# Patient Record
Sex: FEMALE | Race: WHITE | Employment: UNEMPLOYED | URBAN - METROPOLITAN AREA
[De-identification: names, ages, dates, MRNs, and addresses within clinical notes are randomized per-mention and may not be internally consistent; named-entity substitution may affect disease eponyms.]

---

## 2017-01-10 ENCOUNTER — TELEPHONE (OUTPATIENT)
Dept: GASTROENTEROLOGY | Facility: CLINIC | Age: 52
End: 2017-01-10

## 2017-01-15 ASSESSMENT — ENCOUNTER SYMPTOMS
POLYPHAGIA: 0
WEIGHT GAIN: 0
RECTAL BLEEDING: 1
DISTURBANCES IN COORDINATION: 0
WEIGHT LOSS: 0
TREMORS: 0
JOINT SWELLING: 0
NUMBNESS: 1
NAUSEA: 0
DECREASED APPETITE: 0
ALTERED TEMPERATURE REGULATION: 1
BLOOD IN STOOL: 0
ABDOMINAL PAIN: 1
DIZZINESS: 1
MYALGIAS: 1
BLOATING: 0
HEADACHES: 0
MEMORY LOSS: 0
JAUNDICE: 0
ARTHRALGIAS: 0
HEARTBURN: 1
MUSCLE WEAKNESS: 0
SEIZURES: 0
SPEECH CHANGE: 0
LOSS OF CONSCIOUSNESS: 0
CHILLS: 1
HALLUCINATIONS: 0
TINGLING: 1
BOWEL INCONTINENCE: 0
PARALYSIS: 0
NECK PAIN: 0
INCREASED ENERGY: 1
VOMITING: 0
STIFFNESS: 0
CONSTIPATION: 0
NIGHT SWEATS: 1
FATIGUE: 1
POLYDIPSIA: 1
FEVER: 0
DIARRHEA: 1
WEAKNESS: 0
RECTAL PAIN: 1
BACK PAIN: 1

## 2017-01-17 ENCOUNTER — OFFICE VISIT (OUTPATIENT)
Dept: GASTROENTEROLOGY | Facility: CLINIC | Age: 52
End: 2017-01-17

## 2017-01-17 VITALS
SYSTOLIC BLOOD PRESSURE: 92 MMHG | OXYGEN SATURATION: 95 % | BODY MASS INDEX: 21.16 KG/M2 | HEART RATE: 73 BPM | DIASTOLIC BLOOD PRESSURE: 55 MMHG | HEIGHT: 62 IN | WEIGHT: 115 LBS

## 2017-01-17 DIAGNOSIS — K50.80 CROHN'S DISEASE OF BOTH SMALL AND LARGE INTESTINE WITHOUT COMPLICATION (H): Primary | ICD-10-CM

## 2017-01-17 NOTE — Clinical Note
1/17/2017       RE: Melodie Dhillon  [address] [facility] [address]     Dear Colleague,    Thank you for referring your patient, Melodie Dhillon, to the [facility] GASTROENTEROLOGY AND IBD at [facility]. Please see a copy of my visit note below.    GI CLINIC VISIT    CC/REFERRING MD: Isidoro Esqueda MD    REASON FOR follow up: Crohn's Disease    IBD history:  Age at diagnosis: 49  Extend of disease: Ileum  Disease phenotype: inflammatory  Tierney-anal disease: none  Current CD medications: Infliximab  Prior IBD surgeries: none  Prior IBD Medications: prednisone.    IBD monitoring:  -TPMT level: 34.2  -Thiopurine levels: none  -IFX level: > 34 and ab neg 6/9/2015 and 1/2016    Please see initial consult note in 4/2015 for full details. Multiple colonoscopies by Dr. Esqueda that showed ileal inflammation. One colonoscopy also with cecal ulceration. Biopsies show non-specific inflammation in the ileum and ischemic changes in the cecum. She had been tried on steroids with minimal change in symptoms. Started on infliximab 5m/kg in February 2015 but only received 2 infusions due to nausea/vomiting. Referred to [facility] IBD clinic. Dr. Gudino reviewed pathology and one of the TI biopsies showed active chronic enteritis with pyloric metaplasia supportive of Crohn's Disease. Infliximab levels were adequate and ab neg. Restarted on Remicade early June 2015 after 2 months break.     Repeat colonoscopy 1/2016 normal (quiescant crohn's on right sided biopsies). MRE with some changes in jejunum but single balloon unremarkable.    Today:     Overall things are stable to somewhat better.     1. Nausea - resolved after stopping marijuana    2. Irregular bowel habits persist. She takes fiber bars. Levsin helps with cramping. May have several loose stools one day and hard stools the next. No blood.    Her PCP had her try Viberz but this didn't help.    3. Chronic left flank pain -  unchanged    4. Weight - stable    5. Had PNA in November - delayed Remicade by one month. This has resolved.      ROS:    Additional ROS  No fevers since November  No blurry vision, double vision or change in vision  No sore throat  No lymphadenopathy  No headache, paraesthesias, or weakness in a limb  No shortness of breath or wheezing  No chest pain or pressure  No arthralgias or myalgias  No rashes or skin changes  No odynophagia or dysphagia  No BRBPR, hematochezia, melena  No dysuria, frequency or urgency  No hot/cold intolerance or polyria  No anxiety or depression    Extra intestinal manifestations of IBD:  No uveitis/episcleritis  No aphthous ulcers    No arthritis    No erythema nodosum/pyoderma gangrenosum.     PERTINENT PAST MEDICAL HISTORY:  1. Crohn's as above  2. Cholelithiasis s/p cholecystecomy  3. Short segment Saleh's esophagus    PREVIOUS SURGERIES:  1. S/p claudine    ALLERGIES:  Allergies       Allergen     Reactions             Luminal [Phenobarbital]     Shortness Of Breath and Anaphylaxis             Penicillins     Unknown             Codeine     Swelling and Rash           PERTINENT MEDICATIONS:      Current Outpatient Prescriptions   Medication     Escitalopram Oxalate (LEXAPRO PO)     hyoscyamine (LEVSIN) 0.125 MG tablet     Calcium Carbonate-Vit D-Min (CALCIUM 1200 PO)     Cholecalciferol (VITAMIN D3 PO)     Esomeprazole Magnesium (NEXIUM PO)     Acetaminophen (TYLENOL PO)     DIAZEPAM PO     ondansetron (ZOFRAN-ODT) 4 MG disintegrating tablet     No current facility-administered medications for this visit.                                           SOCIAL HISTORY:     Social History       History       Social History             Marital Status:                  Spouse Name:     N/A             Number of Children:     N/A             Years of Education:     N/A       Occupational History             Not on file.       Social History Main Topics             Smoking status:      "Current Every Day Smoker -- 0.20 packs/day             Smokeless tobacco:     Never Used             Alcohol Use:     No             Drug Use:     No             Sexual Activity:     Not on file       Other Topics     Concern             Not on file       Social History Narrative                FAMILY HISTORY:  Reports many family have had \"stomach issues' - most notably vomiting. No known CD/UC or IBD. No colon cancer or colon polyps      Past/family/social history reviewed and no changes    PHYSICAL EXAMINATION:  Vitals reviewed, AFVSS   BP 92/55 mmHg  Pulse 73  Ht 1.575 m (5' 2\")  Wt 52.164 kg (115 lb)  BMI 21.03 kg/m2  SpO2 95%  Gen: aaox3, cooperative, pleasant, not dyspneic/diaphoretic, nad  HEENT: ncat, neck supple, no clad, normal op w/o ulcer/exudate, anicteric, mmm  Lymph: No axillary, submandibular, supraclavicular or inguinal lymphadenopathy  Resp/CV unremarkable  Abd: Nondistended, +bs, no hepatosplenomegaly, mild tenderness in epigastric region, no peritoneal signs  Ext: no c/c/e  Skin: warm, perfused, no jaundice        PERTINENT STUDIES:  Office Visit on 05/18/2015       Component     Date     Value     Ref Range     Status             Neutrophil Cytoplasmic IgG Antibody     05/18/2015           Final       Value:<1:20  Reference range: <1:20  (Note)  The ANCA IFA is <1:20; therefore, no further testing will  be performed.  INTERPRETIVE INFORMATION: Anti-Neutrophil Cyto Ab, IgG  Neutrophil Cytoplasmic Antibodies (C-ANCA = granular  cytoplasmic staining, P-ANCA = perinuclear staining) are  found in the serum of over 90 percent of patients with  certain necrotizing systemic vasculitides, and usually in  less than 5 percent of patients with collagen vascular  disease or arthritis.  Performed by [email or web address],  [address] [place] [id number] [phone]  [email or web address], Lucho Melendez MD, Lab. Director            FALGUNI Screen by EIA     05/18/2015        <1.0     Final     "   Value:<1.0  Interpretation: Negative            Rheumatoid Factor     05/18/2015     <20      <20 IU/mL     Final             Cyclic Cit Pept IgG/IgA     05/18/2015        <20 UNITS     Final       Value:<20  Interpretation: Negative            HLA B27 Typing     05/18/2015     Specimen received - Immunology report to follow upon completion.         Final             I33Tmgd Method     05/18/2015     SSOP         Final             B locus     05/18/2015     B27 Neg         Final             LABS reviewed. CBC normal apart from slightly high lymphocytes. LFTs normal. Albumin had decreased to 3.    ESR and CRP elevated in December (around time she had PNA)    ASSESSMENT/PLAN:  50 YO F with ileal Crohn's Disease now on infliximab here for follow up.     1. Crohn's disease - ileo-colitis  -all of our investigation has showed quiescent disease (ileo-colonoscopy with endoscopic healing and enteroscopy was normal). MRE with some proximal jejunal thickening but single balloon is normal with normal biopsies.   -continue IFX 5 mg/kg - will check drug level prior to April infusion. Consider extending out infusions pending leel results  -check labs with CBC with diff, LFTs, BMP, ESR and CRP with next infusion (follow inflammatory markers)  -check fecal calprotectin - if elevated will consider further evaluation    2. Irregular stool pattern with gas pains and bloating  -I think she has some IBS in setting of IBD - discussed in detail  -continue fiber and probiotic  -continue Levsin  -she has met with nutrition (not particularly helpful for patient) - can re-consider later  -I think she will also benefit form meeting with our GI psychologist - she will think about this    3. Weight - stable    4. History of C diff  -- s/p vanco x 2 weeks in 2015  -- recheck negative    5. Short segment Saleh's on EGD 4/2015  -no dysplasia  -repeat EGD in spring 2018    6. Chronic nausea  -resolved after stopping marijuana    7. Osteoporosis  - per patient seen on DEXA scan  -she is following with her PCP for this  -she is currently on vitamin D replacement  -would consider bisphosphonate therapy once vitamin D replete    8. IBD healthcare maintenance:    -- Surveillance for colon cancer: Given disease, patient is due for surveillance colonoscopy in 2023  -- Bone mineral density screening: Osteoporosis as above    -- Calcium 1200mg per day and vitamin D 2000 units per day  -- Recommend that patient is up to date on all age appropriate vaccinations including yearly influenza, hepatitis B, TDaP.    -- Recommend pneumococcal vaccination (and booster every 5 years).    -- Yearly assessment for latent Tb (verbal screening and exam, PPD or QuantiFERON-Tb testing) - UTD 1/2016  -- Due to the immunosuppression in this patient, I would not advise live vaccines such as varicella/VZV, intranasal influenza, or yellow fever vaccine (if travelling).    -- Yearly skin exam to screen for skin cancer  -- Yearly pap smear    RTC 6 months or sooner as needed    Thank you for this consultation. It was a pleasure to participate in the care of this patient; please contact us with any further questions.     Vernon Pino MD    [facility]  Division of Gastroenterology, Hepatology and Nutrition                       Answers for HPI/ROS submitted by the patient on 1/15/2017   General Symptoms: Yes  Skin Symptoms: No  HENT Symptoms: No  EYE SYMPTOMS: No  HEART SYMPTOMS: No  LUNG SYMPTOMS: No  INTESTINAL SYMPTOMS: Yes  URINARY SYMPTOMS: No  GYNECOLOGIC SYMPTOMS: No  BREAST SYMPTOMS: No  SKELETAL SYMPTOMS: Yes  BLOOD SYMPTOMS: No  NERVOUS SYSTEM SYMPTOMS: Yes  MENTAL HEALTH SYMPTOMS: No  Fever: No  Loss of appetite: No  Weight loss: No  Weight gain: No  Fatigue: Yes  Night sweats: Yes  Chills: Yes  Increased stress: Yes  Excessive hunger: No  Excessive thirst: Yes  Feeling hot or cold when others believe the temperature is normal: Yes  Loss of  height: No  Post-operative complications: No  Surgical site pain: No  Hallucinations: No  Change in or Loss of Energy: Yes  Hyperactivity: No  Confusion: No  Heart burn or indigestion: Yes  Nausea: No  Vomiting: No  Abdominal pain: Yes  Bloating: No  Constipation: No  Diarrhea: Yes  Blood in stool: No  Black stools: Yes  Rectal or Anal pain: Yes  Fecal incontinence: No  Rectal bleeding: Yes  Yellowing of skin or eyes: No  Vomit with blood: No  Change in stools: No  Hemorrhoids: No  Back pain: Yes  Muscle aches: Yes  Neck pain: No  Swollen joints: No  Joint pain: No  Bone pain: Yes  Muscle weakness: No  Joint stiffness: No  Bone fracture: No  Trouble with coordination: No  Dizziness or trouble with balance: Yes  Fainting or black-out spells: No  Memory loss: No  Headache: No  Seizures: No  Speech problems: No  Tingling: Yes  Tremor: No  Weakness: No  Paralysis: No  Numbness: Yes    Again, thank you for allowing me to participate in the care of your patient.      Sincerely,    Vernon Pino MD

## 2017-01-17 NOTE — NURSING NOTE
"Chief Complaint   Patient presents with     Colitis       Filed Vitals:    01/17/17 0740   BP: 92/55   Pulse: 73   Height: 1.575 m (5' 2\")   Weight: 52.164 kg (115 lb)   SpO2: 95%       Body mass index is 21.03 kg/(m^2).      Rosanna Menjivar CMA                          "

## 2017-01-17 NOTE — Clinical Note
1/17/2017      RE: Melodie Dhillon  [address] [facility] [address]       GI CLINIC VISIT    CC/REFERRING MD: Isidoro Esqueda MD    REASON FOR follow up: Crohn's Disease    IBD history:  Age at diagnosis: 49  Extend of disease: Ileum  Disease phenotype: inflammatory  Tierney-anal disease: none  Current CD medications: Infliximab  Prior IBD surgeries: none  Prior IBD Medications: prednisone.    IBD monitoring:  -TPMT level: 34.2  -Thiopurine levels: none  -IFX level: > 34 and ab neg 6/9/2015 and 1/2016    Please see initial consult note in 4/2015 for full details. Multiple colonoscopies by Dr. Esqueda that showed ileal inflammation. One colonoscopy also with cecal ulceration. Biopsies show non-specific inflammation in the ileum and ischemic changes in the cecum. She had been tried on steroids with minimal change in symptoms. Started on infliximab 5m/kg in February 2015 but only received 2 infusions due to nausea/vomiting. Referred to [facility] IBD clinic. Dr. Gudino reviewed pathology and one of the TI biopsies showed active chronic enteritis with pyloric metaplasia supportive of Crohn's Disease. Infliximab levels were adequate and ab neg. Restarted on Remicade early June 2015 after 2 months break.     Repeat colonoscopy 1/2016 normal (quiescant crohn's on right sided biopsies). MRE with some changes in jejunum but single balloon unremarkable.    Today:     Overall things are stable to somewhat better.     1. Nausea - resolved after stopping marijuana    2. Irregular bowel habits persist. She takes fiber bars. Levsin helps with cramping. May have several loose stools one day and hard stools the next. No blood.    Her PCP had her try Viberz but this didn't help.    3. Chronic left flank pain - unchanged    4. Weight - stable    5. Had PNA in November - delayed Remicade by one month. This has resolved.      ROS:    Additional ROS  No fevers since November  No blurry vision, double vision or change in  vision  No sore throat  No lymphadenopathy  No headache, paraesthesias, or weakness in a limb  No shortness of breath or wheezing  No chest pain or pressure  No arthralgias or myalgias  No rashes or skin changes  No odynophagia or dysphagia  No BRBPR, hematochezia, melena  No dysuria, frequency or urgency  No hot/cold intolerance or polyria  No anxiety or depression    Extra intestinal manifestations of IBD:  No uveitis/episcleritis  No aphthous ulcers    No arthritis    No erythema nodosum/pyoderma gangrenosum.     PERTINENT PAST MEDICAL HISTORY:  1. Crohn's as above  2. Cholelithiasis s/p cholecystecomy  3. Short segment Saleh's esophagus    PREVIOUS SURGERIES:  1. S/p claudine    ALLERGIES:  Allergies       Allergen     Reactions             Luminal [Phenobarbital]     Shortness Of Breath and Anaphylaxis             Penicillins     Unknown             Codeine     Swelling and Rash           PERTINENT MEDICATIONS:      Current Outpatient Prescriptions   Medication     Escitalopram Oxalate (LEXAPRO PO)     hyoscyamine (LEVSIN) 0.125 MG tablet     Calcium Carbonate-Vit D-Min (CALCIUM 1200 PO)     Cholecalciferol (VITAMIN D3 PO)     Esomeprazole Magnesium (NEXIUM PO)     Acetaminophen (TYLENOL PO)     DIAZEPAM PO     ondansetron (ZOFRAN-ODT) 4 MG disintegrating tablet     No current facility-administered medications for this visit.                                           SOCIAL HISTORY:     Social History       History       Social History             Marital Status:                  Spouse Name:     N/A             Number of Children:     N/A             Years of Education:     N/A       Occupational History             Not on file.       Social History Main Topics             Smoking status:     Current Every Day Smoker -- 0.20 packs/day             Smokeless tobacco:     Never Used             Alcohol Use:     No             Drug Use:     No             Sexual Activity:     Not on file       Other Topics   "   Concern             Not on file       Social History Narrative                FAMILY HISTORY:  Reports many family have had \"stomach issues' - most notably vomiting. No known CD/UC or IBD. No colon cancer or colon polyps      Past/family/social history reviewed and no changes    PHYSICAL EXAMINATION:  Vitals reviewed, AFVSS   BP 92/55 mmHg  Pulse 73  Ht 1.575 m (5' 2\")  Wt 52.164 kg (115 lb)  BMI 21.03 kg/m2  SpO2 95%  Gen: aaox3, cooperative, pleasant, not dyspneic/diaphoretic, nad  HEENT: ncat, neck supple, no clad, normal op w/o ulcer/exudate, anicteric, mmm  Lymph: No axillary, submandibular, supraclavicular or inguinal lymphadenopathy  Resp/CV unremarkable  Abd: Nondistended, +bs, no hepatosplenomegaly, mild tenderness in epigastric region, no peritoneal signs  Ext: no c/c/e  Skin: warm, perfused, no jaundice        PERTINENT STUDIES:  Office Visit on 05/18/2015       Component     Date     Value     Ref Range     Status             Neutrophil Cytoplasmic IgG Antibody     05/18/2015           Final       Value:<1:20  Reference range: <1:20  (Note)  The ANCA IFA is <1:20; therefore, no further testing will  be performed.  INTERPRETIVE INFORMATION: Anti-Neutrophil Cyto Ab, IgG  Neutrophil Cytoplasmic Antibodies (C-ANCA = granular  cytoplasmic staining, P-ANCA = perinuclear staining) are  found in the serum of over 90 percent of patients with  certain necrotizing systemic vasculitides, and usually in  less than 5 percent of patients with collagen vascular  disease or arthritis.  Performed by [facility],  [address] [place] [id number] [phone]  [email or web address], Lucho Melendez MD, Lab. Director            FALGUNI Screen by EIA     05/18/2015        <1.0     Final       Value:<1.0  Interpretation: Negative            Rheumatoid Factor     05/18/2015     <20      <20 IU/mL     Final             Cyclic Cit Pept IgG/IgA     05/18/2015        <20 UNITS     Final       Value:<20  Interpretation: Negative "            HLA B27 Typing     05/18/2015     Specimen received - Immunology report to follow upon completion.         Final             C68Tbfp Method     05/18/2015     SSOP         Final             B locus     05/18/2015     B27 Neg         Final             LABS reviewed. CBC normal apart from slightly high lymphocytes. LFTs normal. Albumin had decreased to 3.    ESR and CRP elevated in December (around time she had PNA)    ASSESSMENT/PLAN:  48 YO F with ileal Crohn's Disease now on infliximab here for follow up.     1. Crohn's disease - ileo-colitis  -all of our investigation has showed quiescent disease (ileo-colonoscopy with endoscopic healing and enteroscopy was normal). MRE with some proximal jejunal thickening but single balloon is normal with normal biopsies.   -continue IFX 5 mg/kg - will check drug level prior to April infusion. Consider extending out infusions pending leel results  -check labs with CBC with diff, LFTs, BMP, ESR and CRP with next infusion (follow inflammatory markers)  -check fecal calprotectin - if elevated will consider further evaluation    2. Irregular stool pattern with gas pains and bloating  -I think she has some IBS in setting of IBD - discussed in detail  -continue fiber and probiotic  -continue Levsin  -she has met with nutrition (not particularly helpful for patient) - can re-consider later  -I think she will also benefit form meeting with our GI psychologist - she will think about this    3. Weight - stable    4. History of C diff  -- s/p vanco x 2 weeks in 2015  -- recheck negative    5. Short segment Saleh's on EGD 4/2015  -no dysplasia  -repeat EGD in spring 2018    6. Chronic nausea  -resolved after stopping marijuana    7. Osteoporosis - per patient seen on DEXA scan  -she is following with her PCP for this  -she is currently on vitamin D replacement  -would consider bisphosphonate therapy once vitamin D replete    8. IBD healthcare maintenance:    -- Surveillance for  colon cancer: Given disease, patient is due for surveillance colonoscopy in 2023  -- Bone mineral density screening: Osteoporosis as above    -- Calcium 1200mg per day and vitamin D 2000 units per day  -- Recommend that patient is up to date on all age appropriate vaccinations including yearly influenza, hepatitis B, TDaP.    -- Recommend pneumococcal vaccination (and booster every 5 years).    -- Yearly assessment for latent Tb (verbal screening and exam, PPD or QuantiFERON-Tb testing) - UTD 1/2016  -- Due to the immunosuppression in this patient, I would not advise live vaccines such as varicella/VZV, intranasal influenza, or yellow fever vaccine (if travelling).    -- Yearly skin exam to screen for skin cancer  -- Yearly pap smear    RTC 6 months or sooner as needed    Thank you for this consultation. It was a pleasure to participate in the care of this patient; please contact us with any further questions.     Vernon Pino MD    [facility]  Division of Gastroenterology, Hepatology and Nutrition                     Answers for HPI/ROS submitted by the patient on 1/15/2017   General Symptoms: Yes  Skin Symptoms: No  HENT Symptoms: No  EYE SYMPTOMS: No  HEART SYMPTOMS: No  LUNG SYMPTOMS: No  INTESTINAL SYMPTOMS: Yes  URINARY SYMPTOMS: No  GYNECOLOGIC SYMPTOMS: No  BREAST SYMPTOMS: No  SKELETAL SYMPTOMS: Yes  BLOOD SYMPTOMS: No  NERVOUS SYSTEM SYMPTOMS: Yes  MENTAL HEALTH SYMPTOMS: No  Fever: No  Loss of appetite: No  Weight loss: No  Weight gain: No  Fatigue: Yes  Night sweats: Yes  Chills: Yes  Increased stress: Yes  Excessive hunger: No  Excessive thirst: Yes  Feeling hot or cold when others believe the temperature is normal: Yes  Loss of height: No  Post-operative complications: No  Surgical site pain: No  Hallucinations: No  Change in or Loss of Energy: Yes  Hyperactivity: No  Confusion: No  Heart burn or indigestion: Yes  Nausea: No  Vomiting: No  Abdominal pain:  Yes  Bloating: No  Constipation: No  Diarrhea: Yes  Blood in stool: No  Black stools: Yes  Rectal or Anal pain: Yes  Fecal incontinence: No  Rectal bleeding: Yes  Yellowing of skin or eyes: No  Vomit with blood: No  Change in stools: No  Hemorrhoids: No  Back pain: Yes  Muscle aches: Yes  Neck pain: No  Swollen joints: No  Joint pain: No  Bone pain: Yes  Muscle weakness: No  Joint stiffness: No  Bone fracture: No  Trouble with coordination: No  Dizziness or trouble with balance: Yes  Fainting or black-out spells: No  Memory loss: No  Headache: No  Seizures: No  Speech problems: No  Tingling: Yes  Tremor: No  Weakness: No  Paralysis: No  Numbness: Yes    Vernon Pino MD

## 2017-01-17 NOTE — PATIENT INSTRUCTIONS
1. Follow labs with next infusion    2. Check infliximab (Remicade level) prior to April  infusion    3. Check fecal calprotectin level  You may  your stool sample containers at Lab 1st Floor  before you leave today.  You may drop off the stool samples at any [facility]    4. Keep up the great work    5. Continue Levsin    6. Consider meeting with our GI psychologist, Chelsie Saunders  Please call Nina  to schedule appt     7. Continue to work with support groups from the [facility]    8. Continue fiber supplements    9. Please continue to work to quit smoking    10. We will talk to the infusion center to make sure they have the right orders      Follow up in 6 months or sooner if needed  July 18  Dr. Pino  [facility]  8 am check in time 985     Call with questions.

## 2017-01-17 NOTE — NURSING NOTE
Printed after visit summary given to patient along with follow up appt.  Will do infliximab level with April infusion.  Patient is thinking about switching to [facility] and will let us know.

## 2017-01-17 NOTE — PROGRESS NOTES
GI CLINIC VISIT    CC/REFERRING MD: Isidoro Esqueda MD    REASON FOR follow up: Crohn's Disease    IBD history:  Age at diagnosis: 49  Extend of disease: Ileum  Disease phenotype: inflammatory  Tierney-anal disease: none  Current CD medications: Infliximab  Prior IBD surgeries: none  Prior IBD Medications: prednisone.    IBD monitoring:  -TPMT level: 34.2  -Thiopurine levels: none  -IFX level: > 34 and ab neg 6/9/2015 and 1/2016    Please see initial consult note in 4/2015 for full details. Multiple colonoscopies by Dr. Esqueda that showed ileal inflammation. One colonoscopy also with cecal ulceration. Biopsies show non-specific inflammation in the ileum and ischemic changes in the cecum. She had been tried on steroids with minimal change in symptoms. Started on infliximab 5m/kg in February 2015 but only received 2 infusions due to nausea/vomiting. Referred to [facility] IBD clinic. Dr. Gudino reviewed pathology and one of the TI biopsies showed active chronic enteritis with pyloric metaplasia supportive of Crohn's Disease. Infliximab levels were adequate and ab neg. Restarted on Remicade early June 2015 after 2 months break.     Repeat colonoscopy 1/2016 normal (quiescant crohn's on right sided biopsies). MRE with some changes in jejunum but single balloon unremarkable.    Today:     Overall things are stable to somewhat better.     1. Nausea - resolved after stopping marijuana    2. Irregular bowel habits persist. She takes fiber bars. Levsin helps with cramping. May have several loose stools one day and hard stools the next. No blood.    Her PCP had her try Viberz but this didn't help.    3. Chronic left flank pain - unchanged    4. Weight - stable    5. Had PNA in November - delayed Remicade by one month. This has resolved.      ROS:    Additional ROS  No fevers since November  No blurry vision, double vision or change in vision  No sore throat  No lymphadenopathy  No headache, paraesthesias, or weakness in a limb  No  shortness of breath or wheezing  No chest pain or pressure  No arthralgias or myalgias  No rashes or skin changes  No odynophagia or dysphagia  No BRBPR, hematochezia, melena  No dysuria, frequency or urgency  No hot/cold intolerance or polyria  No anxiety or depression    Extra intestinal manifestations of IBD:  No uveitis/episcleritis  No aphthous ulcers    No arthritis    No erythema nodosum/pyoderma gangrenosum.     PERTINENT PAST MEDICAL HISTORY:  1. Crohn's as above  2. Cholelithiasis s/p cholecystecomy  3. Short segment Saleh's esophagus    PREVIOUS SURGERIES:  1. S/p claudine    ALLERGIES:  Allergies       Allergen     Reactions             Luminal [Phenobarbital]     Shortness Of Breath and Anaphylaxis             Penicillins     Unknown             Codeine     Swelling and Rash           PERTINENT MEDICATIONS:      Current Outpatient Prescriptions   Medication     Escitalopram Oxalate (LEXAPRO PO)     hyoscyamine (LEVSIN) 0.125 MG tablet     Calcium Carbonate-Vit D-Min (CALCIUM 1200 PO)     Cholecalciferol (VITAMIN D3 PO)     Esomeprazole Magnesium (NEXIUM PO)     Acetaminophen (TYLENOL PO)     DIAZEPAM PO     ondansetron (ZOFRAN-ODT) 4 MG disintegrating tablet     No current facility-administered medications for this visit.                                           SOCIAL HISTORY:     Social History       History       Social History             Marital Status:                  Spouse Name:     N/A             Number of Children:     N/A             Years of Education:     N/A       Occupational History             Not on file.       Social History Main Topics             Smoking status:     Current Every Day Smoker -- 0.20 packs/day             Smokeless tobacco:     Never Used             Alcohol Use:     No             Drug Use:     No             Sexual Activity:     Not on file       Other Topics     Concern             Not on file       Social History Narrative                FAMILY  "HISTORY:  Reports many family have had \"stomach issues' - most notably vomiting. No known CD/UC or IBD. No colon cancer or colon polyps      Past/family/social history reviewed and no changes    PHYSICAL EXAMINATION:  Vitals reviewed, AFVSS   BP 92/55 mmHg  Pulse 73  Ht 1.575 m (5' 2\")  Wt 52.164 kg (115 lb)  BMI 21.03 kg/m2  SpO2 95%  Gen: aaox3, cooperative, pleasant, not dyspneic/diaphoretic, nad  HEENT: ncat, neck supple, no clad, normal op w/o ulcer/exudate, anicteric, mmm  Lymph: No axillary, submandibular, supraclavicular or inguinal lymphadenopathy  Resp/CV unremarkable  Abd: Nondistended, +bs, no hepatosplenomegaly, mild tenderness in epigastric region, no peritoneal signs  Ext: no c/c/e  Skin: warm, perfused, no jaundice        PERTINENT STUDIES:  Office Visit on 05/18/2015       Component     Date     Value     Ref Range     Status             Neutrophil Cytoplasmic IgG Antibody     05/18/2015           Final       Value:<1:20  Reference range: <1:20  (Note)  The ANCA IFA is <1:20; therefore, no further testing will  be performed.  INTERPRETIVE INFORMATION: Anti-Neutrophil Cyto Ab, IgG  Neutrophil Cytoplasmic Antibodies (C-ANCA = granular  cytoplasmic staining, P-ANCA = perinuclear staining) are  found in the serum of over 90 percent of patients with  certain necrotizing systemic vasculitides, and usually in  less than 5 percent of patients with collagen vascular  disease or arthritis.  Performed by [facility],  [address] [place] [id number] [phone]  [email or web address], Lucho Melendez MD, Lab. Director            FALGUNI Screen by EIA     05/18/2015        <1.0     Final       Value:<1.0  Interpretation: Negative            Rheumatoid Factor     05/18/2015     <20      <20 IU/mL     Final             Cyclic Cit Pept IgG/IgA     05/18/2015        <20 UNITS     Final       Value:<20  Interpretation: Negative            HLA B27 Typing     05/18/2015     Specimen received - Immunology report to " follow upon completion.         Final             [id number] Method     05/18/2015     SSOP         Final             B locus     05/18/2015     B27 Neg         Final             LABS reviewed. CBC normal apart from slightly high lymphocytes. LFTs normal. Albumin had decreased to 3.    ESR and CRP elevated in December (around time she had PNA)    ASSESSMENT/PLAN:  48 YO F with ileal Crohn's Disease now on infliximab here for follow up.     1. Crohn's disease - ileo-colitis  -all of our investigation has showed quiescent disease (ileo-colonoscopy with endoscopic healing and enteroscopy was normal). MRE with some proximal jejunal thickening but single balloon is normal with normal biopsies.   -continue IFX 5 mg/kg - will check drug level prior to April infusion. Consider extending out infusions pending leel results  -check labs with CBC with diff, LFTs, BMP, ESR and CRP with next infusion (follow inflammatory markers)  -check fecal calprotectin - if elevated will consider further evaluation    2. Irregular stool pattern with gas pains and bloating  -I think she has some IBS in setting of IBD - discussed in detail  -continue fiber and probiotic  -continue Levsin  -she has met with nutrition (not particularly helpful for patient) - can re-consider later  -I think she will also benefit form meeting with our GI psychologist - she will think about this    3. Weight - stable    4. History of C diff  -- s/p vanco x 2 weeks in 2015  -- recheck negative    5. Short segment Saleh's on EGD 4/2015  -no dysplasia  -repeat EGD in spring 2018    6. Chronic nausea  -resolved after stopping marijuana    7. Osteoporosis - per patient seen on DEXA scan  -she is following with her PCP for this  -she is currently on vitamin D replacement  -would consider bisphosphonate therapy once vitamin D replete    8. IBD healthcare maintenance:    -- Surveillance for colon cancer: Given disease, patient is due for surveillance colonoscopy in 2023  --  Bone mineral density screening: Osteoporosis as above    -- Calcium 1200mg per day and vitamin D 2000 units per day  -- Recommend that patient is up to date on all age appropriate vaccinations including yearly influenza, hepatitis B, TDaP.    -- Recommend pneumococcal vaccination (and booster every 5 years).    -- Yearly assessment for latent Tb (verbal screening and exam, PPD or QuantiFERON-Tb testing) - UTD 1/2016  -- Due to the immunosuppression in this patient, I would not advise live vaccines such as varicella/VZV, intranasal influenza, or yellow fever vaccine (if travelling).    -- Yearly skin exam to screen for skin cancer  -- Yearly pap smear    RTC 6 months or sooner as needed    Thank you for this consultation. It was a pleasure to participate in the care of this patient; please contact us with any further questions.     Vernon Pino MD    [facility]  Division of Gastroenterology, Hepatology and Nutrition                       Answers for HPI/ROS submitted by the patient on 1/15/2017   General Symptoms: Yes  Skin Symptoms: No  HENT Symptoms: No  EYE SYMPTOMS: No  HEART SYMPTOMS: No  LUNG SYMPTOMS: No  INTESTINAL SYMPTOMS: Yes  URINARY SYMPTOMS: No  GYNECOLOGIC SYMPTOMS: No  BREAST SYMPTOMS: No  SKELETAL SYMPTOMS: Yes  BLOOD SYMPTOMS: No  NERVOUS SYSTEM SYMPTOMS: Yes  MENTAL HEALTH SYMPTOMS: No  Fever: No  Loss of appetite: No  Weight loss: No  Weight gain: No  Fatigue: Yes  Night sweats: Yes  Chills: Yes  Increased stress: Yes  Excessive hunger: No  Excessive thirst: Yes  Feeling hot or cold when others believe the temperature is normal: Yes  Loss of height: No  Post-operative complications: No  Surgical site pain: No  Hallucinations: No  Change in or Loss of Energy: Yes  Hyperactivity: No  Confusion: No  Heart burn or indigestion: Yes  Nausea: No  Vomiting: No  Abdominal pain: Yes  Bloating: No  Constipation: No  Diarrhea: Yes  Blood in stool: No  Black stools:  Yes  Rectal or Anal pain: Yes  Fecal incontinence: No  Rectal bleeding: Yes  Yellowing of skin or eyes: No  Vomit with blood: No  Change in stools: No  Hemorrhoids: No  Back pain: Yes  Muscle aches: Yes  Neck pain: No  Swollen joints: No  Joint pain: No  Bone pain: Yes  Muscle weakness: No  Joint stiffness: No  Bone fracture: No  Trouble with coordination: No  Dizziness or trouble with balance: Yes  Fainting or black-out spells: No  Memory loss: No  Headache: No  Seizures: No  Speech problems: No  Tingling: Yes  Tremor: No  Weakness: No  Paralysis: No  Numbness: Yes

## 2017-01-17 NOTE — MR AVS SNAPSHOT
After Visit Summary   1/17/2017    Melodie Dhillon    MRN: [id number]           Patient Information     Date Of Birth          1965        Visit Information        Provider Department      1/17/2017 8:00 AM Vernon Pino MD Gastroenterology and IBD        Today's Diagnoses     Crohn's disease of both small and large intestine without complication (H)    -  1       Care Instructions    1. Follow labs with next infusion    2. Check infliximab (Remicade level) prior to April  infusion    3. Check fecal calprotectin level    4. Keep up the great work    5. Continue Levsin    6. Consider meeting with our GI psychologist, Chelsie Saunders  Please call Nina  to schedule appt     7. Continue to work with support groups from the [facility]    8. Continue fiber supplements    9. Please continue to work to quit smoking    10. We will talk to the infusion center to make sure they have the right orders      Follow up in 6 months or sooner if needed  July 18  Dr. Pino  [facility] clinic  8 am check in time 745     Call with questions.          Follow-ups after your visit        Your next 10 appointments already scheduled     Jul 18, 2017  8:00 AM   (Arrive by 7:45 AM)   RETURN IRRITABLE BOWEL DISEASE with Vernon Pino MD   Gastroenterology and IBD ([facility] [place])    [address] [place] [address]  4th [facility] [address] [phone]              Future tests that were ordered for you today     Open Future Orders        Priority Expected Expires Ordered    infliximab level to be drawn day of infusion prior to start of infusion: Laboratory Miscellaneous Order Routine  1/17/2018 1/17/2017            Who to contact     Please call your clinic at [phone] to:    Ask questions about your health    Make or cancel appointments    Discuss your medicines    Learn about your test results    Speak to your doctor   If you have compliments or concerns about an  "experience at your clinic, or if you wish to file a complaint, please contact [facility] Patient Relations at [phone] or email us at [email or web address]         Additional Information About Your Visit        [employer] Information     Fieldbook gives you secure access to your electronic health record. If you see a primary care provider, you can also send messages to your care team and make appointments. If you have questions, please call your primary care clinic.  If you do not have a primary care provider, please call [phone] and they will assist you.      Fieldbook is an electronic gateway that provides easy, online access to your medical records. With [employer]book, you can request a clinic appointment, read your test results, renew a prescription or communicate with your care team.     To access your existing account, please contact your [facility] or call [phone] for assistance.        Care EveryWhere ID     This is your Care EveryWhere ID. This could be used by other organizations to access your [facility] medical records  [id number]        Your Vitals Were     Pulse Height BMI (Body Mass Index) Pulse Oximetry          73 1.575 m (5' 2\") 21.03 kg/m2 95%         Blood Pressure from Last 3 Encounters:   01/17/17 92/55   05/17/16 117/75   03/17/16 90/51    Weight from Last 3 Encounters:   01/17/17 52.164 kg (115 lb)   05/17/16 51.529 kg (113 lb 9.6 oz)   03/17/16 51.71 kg (114 lb)              We Performed the Following     Calprotectin Fecal        Primary Care Provider Office Phone # Fax #    Sami Wright [phone]       [facility] [address]  [facility] [address]        Thank you!     Thank you for choosing GASTROENTEROLOGY AND IBD  for your care. Our goal is always to provide you with excellent care. Hearing back from our patients is one way we can continue to improve our services. Please take a " few minutes to complete the written survey that you may receive in the mail after your visit with us. Thank you!             Your Updated Medication List - Protect others around you: Learn how to safely use, store and throw away your medicines at www.disposemymeds.org.          This list is accurate as of: 1/17/17  8:48 AM.  Always use your most recent med list.                   Brand Name Dispense Instructions for use    CALCIUM 1200 PO      Take by mouth daily       DIAZEPAM PO      Take 5 mg by mouth daily as needed for anxiety       hyoscyamine 0.125 MG tablet    LEVSIN    90 tablet    Take 1 tablet (125 mcg) by mouth every 6 hours as needed for cramping       LEXAPRO PO          NEXIUM PO      Take 20 mg by mouth every morning (before breakfast)       ondansetron 4 MG ODT tab    ZOFRAN-ODT    120 tablet    Take 1 tablet (4 mg) by mouth every 8 hours as needed for nausea (take 1-2 tablets as needed for nausea)       TYLENOL PO      Take 325 mg by mouth       VITAMIN D3 PO      Take 2,000 Units by mouth daily

## 2017-02-03 ENCOUNTER — TRANSFERRED RECORDS (OUTPATIENT)
Dept: HEALTH INFORMATION MANAGEMENT | Facility: CLINIC | Age: 52
End: 2017-02-03

## 2017-02-08 ENCOUNTER — CARE COORDINATION (OUTPATIENT)
Dept: GASTROENTEROLOGY | Facility: CLINIC | Age: 52
End: 2017-02-08

## 2017-02-08 NOTE — PROGRESS NOTES
Left a message that [facility] had called Beckie and let her know that they would be reaching out to patient to discuss home infusion with her and also left the number for [facility] so patient could call and discuss insurance  benefits etc

## 2017-02-28 ENCOUNTER — CARE COORDINATION (OUTPATIENT)
Dept: GASTROENTEROLOGY | Facility: CLINIC | Age: 52
End: 2017-02-28

## 2017-02-28 DIAGNOSIS — K50.90 CROHN'S DISEASE (H): Primary | ICD-10-CM

## 2017-02-28 NOTE — PROGRESS NOTES
Patient first home infusion scheduled for March 31.  Edited therapy plan to reflect the date of infliximab level to be drawn day of infusion prior to start of infusion.  Will fed exp.kit to home infusion and fax order for [facility] lab infliximab order.

## 2017-03-09 ENCOUNTER — CARE COORDINATION (OUTPATIENT)
Dept: GASTROENTEROLOGY | Facility: CLINIC | Age: 52
End: 2017-03-09

## 2017-03-09 DIAGNOSIS — K50.90 CROHN'S DISEASE (H): Primary | ICD-10-CM

## 2017-03-09 NOTE — NURSING NOTE
Therapy  plan updated to include a one time order for infliximab level to be drawn day of next remicade infusion prior to start of infusion.  Due on March 31.  Pt moving to [facility].  Kit sent to [facility]. Tracking number [id number].  Included the updated therapy plan to kit.

## 2017-03-31 ENCOUNTER — RESULTS ONLY (OUTPATIENT)
Dept: GASTROENTEROLOGY | Facility: CLINIC | Age: 52
End: 2017-03-31

## 2017-03-31 ENCOUNTER — CARE COORDINATION (OUTPATIENT)
Dept: GASTROENTEROLOGY | Facility: CLINIC | Age: 52
End: 2017-03-31

## 2017-03-31 DIAGNOSIS — K50.90 CROHN'S DISEASE (H): ICD-10-CM

## 2017-03-31 LAB
ALBUMIN SERPL-MCNC: 3.5 G/DL (ref 3.4–5)
ALP SERPL-CCNC: 100 U/L (ref 40–150)
ALT SERPL W P-5'-P-CCNC: 20 U/L (ref 0–50)
AST SERPL W P-5'-P-CCNC: 20 U/L (ref 0–45)
BASOPHILS # BLD AUTO: 0 10E9/L (ref 0–0.2)
BASOPHILS NFR BLD AUTO: 0.4 %
BILIRUB DIRECT SERPL-MCNC: <0.1 MG/DL (ref 0–0.2)
BILIRUB SERPL-MCNC: 0.5 MG/DL (ref 0.2–1.3)
CRP SERPL-MCNC: 13.4 MG/L (ref 0–8)
DIFFERENTIAL METHOD BLD: NORMAL
EOSINOPHIL # BLD AUTO: 0.1 10E9/L (ref 0–0.7)
EOSINOPHIL NFR BLD AUTO: 2 %
ERYTHROCYTE [DISTWIDTH] IN BLOOD BY AUTOMATED COUNT: 12.9 % (ref 10–15)
ERYTHROCYTE [SEDIMENTATION RATE] IN BLOOD BY WESTERGREN METHOD: 59 MM/H (ref 0–30)
HCT VFR BLD AUTO: 42.4 % (ref 35–47)
HGB BLD-MCNC: 14.5 G/DL (ref 11.7–15.7)
IMM GRANULOCYTES # BLD: 0 10E9/L (ref 0–0.4)
IMM GRANULOCYTES NFR BLD: 0 %
LYMPHOCYTES # BLD AUTO: 1.7 10E9/L (ref 0.8–5.3)
LYMPHOCYTES NFR BLD AUTO: 34.5 %
MCH RBC QN AUTO: 32.4 PG (ref 26.5–33)
MCHC RBC AUTO-ENTMCNC: 34.2 G/DL (ref 31.5–36.5)
MCV RBC AUTO: 95 FL (ref 78–100)
MONOCYTES # BLD AUTO: 0.4 10E9/L (ref 0–1.3)
MONOCYTES NFR BLD AUTO: 7.1 %
NEUTROPHILS # BLD AUTO: 2.8 10E9/L (ref 1.6–8.3)
NEUTROPHILS NFR BLD AUTO: 56 %
NRBC # BLD AUTO: 0 10*3/UL
NRBC BLD AUTO-RTO: 0 /100
PLATELET # BLD AUTO: 202 10E9/L (ref 150–450)
PROT SERPL-MCNC: 7.3 G/DL (ref 6.8–8.8)
RBC # BLD AUTO: 4.47 10E12/L (ref 3.8–5.2)
WBC # BLD AUTO: 5 10E9/L (ref 4–11)

## 2017-03-31 PROCEDURE — 85025 COMPLETE CBC W/AUTO DIFF WBC: CPT | Performed by: INTERNAL MEDICINE

## 2017-03-31 PROCEDURE — 85652 RBC SED RATE AUTOMATED: CPT | Performed by: INTERNAL MEDICINE

## 2017-03-31 PROCEDURE — 80076 HEPATIC FUNCTION PANEL: CPT | Performed by: INTERNAL MEDICINE

## 2017-03-31 PROCEDURE — 86140 C-REACTIVE PROTEIN: CPT | Performed by: INTERNAL MEDICINE

## 2017-03-31 NOTE — PROGRESS NOTES
Carola from [facility] called to report lab received lab kits dropped off via  with no lab orders.    Followed up with [facility] ( Martha CANCHOLA-[phone]) who had the yumiko labs and had them sent to [facility].    Martha from [facility] will contact [facility] and follow up on lab details.

## 2017-04-03 LAB — MISCELLANEOUS TEST: NORMAL

## 2017-04-04 ENCOUNTER — MEDICAL CORRESPONDENCE (OUTPATIENT)
Dept: HEALTH INFORMATION MANAGEMENT | Facility: CLINIC | Age: 52
End: 2017-04-04

## 2017-04-05 ENCOUNTER — CARE COORDINATION (OUTPATIENT)
Dept: GASTROENTEROLOGY | Facility: CLINIC | Age: 52
End: 2017-04-05

## 2017-04-05 DIAGNOSIS — R10.9 ABDOMINAL CRAMPING: ICD-10-CM

## 2017-04-05 RX ORDER — HYOSCYAMINE SULFATE 0.125 MG
0.12 TABLET ORAL EVERY 6 HOURS PRN
Qty: 90 TABLET | Refills: 3 | Status: SHIPPED | OUTPATIENT
Start: 2017-04-05

## 2017-04-05 NOTE — PROGRESS NOTES
Called [facility] and faxed last 2 clinic notes.  [place] needs to receive payment for last 3 infusins.  Pt is switching over to [facility] and notified Fabiola at [facility] of change.       Fabiola, at [facility] in [place] called requesting visit notes from last two visits (1/17/16 and 05/17/16) with Dr Pino to be faxed as she needs them for the justification for the pt's Remicade infusion. Fabiola is requesting these be faxed to Attn: Fabiola [phone] and if any questions, she can be reached at [phone].     Thank you!

## 2017-04-05 NOTE — TELEPHONE ENCOUNTER
hyoscyamine (LEVSIN) 0.125 MG       Last Written Prescription Date:  1/5/16  Last Fill Quantity: 90,   # refills: 3  Last Office Visit : 1/17/17  Future Office visit:  7/18/17

## 2017-04-06 LAB — LAB SCANNED RESULT: NORMAL

## 2017-04-11 ENCOUNTER — HOSPITAL ENCOUNTER (OUTPATIENT)
Dept: LAB | Facility: CLINIC | Age: 52
Discharge: HOME | End: 2017-04-11
Attending: INTERNAL MEDICINE | Admitting: INTERNAL MEDICINE
Payer: COMMERCIAL

## 2017-04-11 PROCEDURE — 83993 ASSAY FOR CALPROTECTIN FECAL: CPT | Performed by: INTERNAL MEDICINE

## 2017-04-11 PROCEDURE — 87493 C DIFF AMPLIFIED PROBE: CPT | Performed by: INTERNAL MEDICINE

## 2017-04-12 ENCOUNTER — CARE COORDINATION (OUTPATIENT)
Dept: GASTROENTEROLOGY | Facility: CLINIC | Age: 52
End: 2017-04-12

## 2017-04-12 ENCOUNTER — MEDICAL CORRESPONDENCE (OUTPATIENT)
Dept: HEALTH INFORMATION MANAGEMENT | Facility: CLINIC | Age: 52
End: 2017-04-12

## 2017-04-12 ENCOUNTER — HOSPITAL ENCOUNTER (OUTPATIENT)
Dept: LAB | Facility: CLINIC | Age: 52
End: 2017-04-12
Attending: INTERNAL MEDICINE
Payer: COMMERCIAL

## 2017-04-12 DIAGNOSIS — K50.90 CROHN'S DISEASE (H): ICD-10-CM

## 2017-04-12 DIAGNOSIS — K50.90 CROHN'S DISEASE (H): Primary | ICD-10-CM

## 2017-04-12 LAB
C DIFF TOX B STL QL: NORMAL
SPECIMEN SOURCE: NORMAL

## 2017-04-12 RX ORDER — ONDANSETRON 2 MG/ML
4 INJECTION INTRAMUSCULAR; INTRAVENOUS ONCE
Status: CANCELLED
Start: 2017-04-12 | End: 2017-04-12

## 2017-04-12 NOTE — PROGRESS NOTES
Pt left a voice mail message that she has been experiencing diarrhea for about one week.  That not sure her crohns or ibs.  Noted blood in stool.  Had a container that she marekd with her name and birthdate and having her father in law drop off today at [facility]. .  Called and reached her voice mail.  Left message to call back to discuss symptoms.  C diff order placed.  Called lab to check with not in c diff container is okay and told by microbiology that it is okay.    Pt called back and told me she had a clean specimen container from her primary care clinic.  Told her that the [facility] is a sports medicine clinic and does not have lab.  The choices are [facility] or [place]. Pt will call her father in law and drop off at [place].   Also called [facility] to check on infliximab level, Results to be faxed. Patient states she is seeing her primary tomorrow.  Been having diarrhea since Saturday.  7 stools yesterday.  Has been seeing blood in stool since Saturday.  And having abd pain similar to pain she had experienced in the past when had c diff.  Pt know called back and will drop off at [facility].

## 2017-04-12 NOTE — PROGRESS NOTES
Changed therapy plan to every 6 weeks.  Also add zofran for every visit and preference for Benadryl IV versus oral.  Called [facility] and talked to Maria Isabel.  Pharmacist about change in intervals and medications. .  [facility] will work on new prior authorization.

## 2017-04-12 NOTE — PROGRESS NOTES
I agree with checking stool studies including c diff. Also let's get a fecal calprotectin.     Let's change IFX to 5mg/kg every 6 weeks.     We will see what stool studies show.

## 2017-04-12 NOTE — PROGRESS NOTES
Thank you for the update. I am sorry to hear she is having symptoms.     When was her lat infusion? Was it 3/31/2017? I assume she does not feel better since infusion.    I agree with checking stool studies including c diff. Also let's get a fecal calprotectin.    Let's change IFX to 5mg/kg every 6 weeks.    We will see what stool studies show.    Vernon Pino MD    [facility]  Division of Gastroenterology, Hepatology and Nutrition

## 2017-04-12 NOTE — PROGRESS NOTES
Called pt to let her know her infliximab level results and change in interval.  Left this message along with [facility] will work on her new prior authorization and also needs to have a fecal calprotectin.  Can  and drop off at any [place] clinic.

## 2017-04-13 ENCOUNTER — CARE COORDINATION (OUTPATIENT)
Dept: GASTROENTEROLOGY | Facility: CLINIC | Age: 52
End: 2017-04-13

## 2017-04-13 LAB — CALPROTECTIN STL-MCNT: 31 UG/G

## 2017-04-13 NOTE — PROGRESS NOTES
Thanks for the update. That is great. Once we confirm that fecal calprotectin is elevated we will increase her IFX up 200 mg per infusion (I believe she was at 250 mg per infusion and so would increase to 450 mg per infusion) and have it every 6 weeks. Would also give Entocort until next infusion (if fecal calpro is elevated).    Vernon

## 2017-04-13 NOTE — PROGRESS NOTES
Called pt and left her a message that will await fecal calprotectin.  Relayed the message about will determine change in remicade dosagge or medication dependent on fecal calprotectin and she will be  notified of the results.  Left my name and number.

## 2017-04-13 NOTE — PROGRESS NOTES
Called [facility] to check on dates of pt's infusion dates as pt had a gap in her infusions due to illness.  Confirmed last 3 infusions at [facility]  9/16/2016, 12/6/2016 then 2/3/2017.  Held in November due to upper respiratory symptoms.  First home infusion with [facility] April 3.  Will route to Dr. Pino to determine dosage and intervals for infusions.

## 2017-04-20 ENCOUNTER — CARE COORDINATION (OUTPATIENT)
Dept: GASTROENTEROLOGY | Facility: CLINIC | Age: 52
End: 2017-04-20

## 2017-04-20 NOTE — PROGRESS NOTES
Pt called asking for call to be made to [facility] to verify that she has been receiving Remicade.  Called and left my name and number  on Rae Clark's voice mail.

## 2017-05-12 LAB
ALBUMIN SERPL-MCNC: 3.7 G/DL (ref 3.4–5)
ALP SERPL-CCNC: 87 U/L (ref 40–150)
ALT SERPL W P-5'-P-CCNC: 14 U/L (ref 0–50)
AST SERPL W P-5'-P-CCNC: 18 U/L (ref 0–45)
BASOPHILS # BLD AUTO: 0 10E9/L (ref 0–0.2)
BASOPHILS NFR BLD AUTO: 0.3 %
BILIRUB DIRECT SERPL-MCNC: <0.1 MG/DL (ref 0–0.2)
BILIRUB SERPL-MCNC: 0.5 MG/DL (ref 0.2–1.3)
CRP SERPL-MCNC: <2.9 MG/L (ref 0–8)
DIFFERENTIAL METHOD BLD: NORMAL
EOSINOPHIL # BLD AUTO: 0.1 10E9/L (ref 0–0.7)
EOSINOPHIL NFR BLD AUTO: 0.8 %
ERYTHROCYTE [DISTWIDTH] IN BLOOD BY AUTOMATED COUNT: 13 % (ref 10–15)
ERYTHROCYTE [SEDIMENTATION RATE] IN BLOOD BY WESTERGREN METHOD: 15 MM/H (ref 0–30)
HCT VFR BLD AUTO: 43.1 % (ref 35–47)
HGB BLD-MCNC: 14.6 G/DL (ref 11.7–15.7)
IMM GRANULOCYTES # BLD: 0 10E9/L (ref 0–0.4)
IMM GRANULOCYTES NFR BLD: 0.3 %
LYMPHOCYTES # BLD AUTO: 2.1 10E9/L (ref 0.8–5.3)
LYMPHOCYTES NFR BLD AUTO: 26.9 %
MCH RBC QN AUTO: 32.4 PG (ref 26.5–33)
MCHC RBC AUTO-ENTMCNC: 33.9 G/DL (ref 31.5–36.5)
MCV RBC AUTO: 96 FL (ref 78–100)
MONOCYTES # BLD AUTO: 0.3 10E9/L (ref 0–1.3)
MONOCYTES NFR BLD AUTO: 4.3 %
NEUTROPHILS # BLD AUTO: 5.3 10E9/L (ref 1.6–8.3)
NEUTROPHILS NFR BLD AUTO: 67.4 %
NRBC # BLD AUTO: 0 10*3/UL
NRBC BLD AUTO-RTO: 0 /100
PLATELET # BLD AUTO: 194 10E9/L (ref 150–450)
PROT SERPL-MCNC: 7.3 G/DL (ref 6.8–8.8)
RBC # BLD AUTO: 4.51 10E12/L (ref 3.8–5.2)
WBC # BLD AUTO: 7.9 10E9/L (ref 4–11)

## 2017-05-12 PROCEDURE — 85025 COMPLETE CBC W/AUTO DIFF WBC: CPT | Performed by: INTERNAL MEDICINE

## 2017-05-12 PROCEDURE — 80076 HEPATIC FUNCTION PANEL: CPT | Performed by: INTERNAL MEDICINE

## 2017-05-12 PROCEDURE — 85652 RBC SED RATE AUTOMATED: CPT | Performed by: INTERNAL MEDICINE

## 2017-05-12 PROCEDURE — 86140 C-REACTIVE PROTEIN: CPT | Performed by: INTERNAL MEDICINE

## 2017-06-22 ENCOUNTER — HOME INFUSION (PRE-WILLOW HOME INFUSION) (OUTPATIENT)
Dept: PHARMACY | Facility: CLINIC | Age: 52
End: 2017-06-22

## 2017-06-23 ENCOUNTER — MEDICAL CORRESPONDENCE (OUTPATIENT)
Dept: HEALTH INFORMATION MANAGEMENT | Facility: CLINIC | Age: 52
End: 2017-06-23

## 2017-06-23 ENCOUNTER — HOME INFUSION (PRE-WILLOW HOME INFUSION) (OUTPATIENT)
Dept: PHARMACY | Facility: CLINIC | Age: 52
End: 2017-06-23

## 2017-06-23 LAB
ALBUMIN SERPL-MCNC: 3.4 G/DL (ref 3.4–5)
ALP SERPL-CCNC: 71 U/L (ref 40–150)
ALT SERPL W P-5'-P-CCNC: 17 U/L (ref 0–50)
ANION GAP SERPL CALCULATED.3IONS-SCNC: 13 MMOL/L (ref 3–14)
AST SERPL W P-5'-P-CCNC: 15 U/L (ref 0–45)
BASOPHILS # BLD AUTO: 0 10E9/L (ref 0–0.2)
BASOPHILS NFR BLD AUTO: 0.4 %
BILIRUB DIRECT SERPL-MCNC: <0.1 MG/DL (ref 0–0.2)
BILIRUB SERPL-MCNC: 0.5 MG/DL (ref 0.2–1.3)
BUN SERPL-MCNC: 11 MG/DL (ref 7–30)
CALCIUM SERPL-MCNC: 8.8 MG/DL (ref 8.5–10.1)
CHLORIDE SERPL-SCNC: 108 MMOL/L (ref 94–109)
CO2 SERPL-SCNC: 21 MMOL/L (ref 20–32)
CREAT SERPL-MCNC: 0.68 MG/DL (ref 0.52–1.04)
CRP SERPL-MCNC: <2.9 MG/L (ref 0–8)
DIFFERENTIAL METHOD BLD: NORMAL
EOSINOPHIL # BLD AUTO: 0.1 10E9/L (ref 0–0.7)
EOSINOPHIL NFR BLD AUTO: 1.1 %
ERYTHROCYTE [DISTWIDTH] IN BLOOD BY AUTOMATED COUNT: 13.2 % (ref 10–15)
ERYTHROCYTE [SEDIMENTATION RATE] IN BLOOD BY WESTERGREN METHOD: 16 MM/H (ref 0–30)
GFR SERPL CREATININE-BSD FRML MDRD: ABNORMAL ML/MIN/1.7M2
GLUCOSE SERPL-MCNC: 117 MG/DL (ref 70–99)
HCT VFR BLD AUTO: 38.9 % (ref 35–47)
HGB BLD-MCNC: 13.3 G/DL (ref 11.7–15.7)
IMM GRANULOCYTES # BLD: 0 10E9/L (ref 0–0.4)
IMM GRANULOCYTES NFR BLD: 0.1 %
LYMPHOCYTES # BLD AUTO: 2.3 10E9/L (ref 0.8–5.3)
LYMPHOCYTES NFR BLD AUTO: 32.1 %
MCH RBC QN AUTO: 32.9 PG (ref 26.5–33)
MCHC RBC AUTO-ENTMCNC: 34.2 G/DL (ref 31.5–36.5)
MCV RBC AUTO: 96 FL (ref 78–100)
MONOCYTES # BLD AUTO: 0.4 10E9/L (ref 0–1.3)
MONOCYTES NFR BLD AUTO: 5.3 %
NEUTROPHILS # BLD AUTO: 4.4 10E9/L (ref 1.6–8.3)
NEUTROPHILS NFR BLD AUTO: 61 %
NRBC # BLD AUTO: 0 10*3/UL
NRBC BLD AUTO-RTO: 0 /100
PLATELET # BLD AUTO: 181 10E9/L (ref 150–450)
POTASSIUM SERPL-SCNC: 3.9 MMOL/L (ref 3.4–5.3)
PROT SERPL-MCNC: 6.3 G/DL (ref 6.8–8.8)
RBC # BLD AUTO: 4.04 10E12/L (ref 3.8–5.2)
SODIUM SERPL-SCNC: 142 MMOL/L (ref 133–144)
WBC # BLD AUTO: 7.2 10E9/L (ref 4–11)

## 2017-06-23 PROCEDURE — 80076 HEPATIC FUNCTION PANEL: CPT | Performed by: INTERNAL MEDICINE

## 2017-06-23 PROCEDURE — 86140 C-REACTIVE PROTEIN: CPT | Performed by: INTERNAL MEDICINE

## 2017-06-23 PROCEDURE — 85652 RBC SED RATE AUTOMATED: CPT | Performed by: INTERNAL MEDICINE

## 2017-06-23 PROCEDURE — 85025 COMPLETE CBC W/AUTO DIFF WBC: CPT | Performed by: INTERNAL MEDICINE

## 2017-06-23 PROCEDURE — 80048 BASIC METABOLIC PNL TOTAL CA: CPT | Performed by: INTERNAL MEDICINE

## 2017-07-13 ENCOUNTER — TELEPHONE (OUTPATIENT)
Dept: GASTROENTEROLOGY | Facility: CLINIC | Age: 52
End: 2017-07-13

## 2017-07-17 ASSESSMENT — ENCOUNTER SYMPTOMS
SINUS PAIN: 0
MUSCLE CRAMPS: 1
SPEECH CHANGE: 0
SWOLLEN GLANDS: 0
STIFFNESS: 1
NECK PAIN: 0
PALPITATIONS: 1
LEG SWELLING: 0
WEAKNESS: 1
TINGLING: 0
EYE REDNESS: 0
NAUSEA: 1
HOARSE VOICE: 1
TROUBLE SWALLOWING: 0
BACK PAIN: 1
TACHYCARDIA: 0
HEMATURIA: 0
BOWEL INCONTINENCE: 1
SMELL DISTURBANCE: 0
HYPOTENSION: 1
DISTURBANCES IN COORDINATION: 1
POLYDIPSIA: 0
HYPERTENSION: 0
HEARTBURN: 1
DYSURIA: 0
SINUS CONGESTION: 0
NERVOUS/ANXIOUS: 1
RECTAL PAIN: 1
WEIGHT LOSS: 0
CONSTIPATION: 1
MYALGIAS: 1
HEADACHES: 1
DIZZINESS: 1
EYE WATERING: 0
BRUISES/BLEEDS EASILY: 1
EXERCISE INTOLERANCE: 0
EYE PAIN: 0
DECREASED APPETITE: 1
DIARRHEA: 1
ALTERED TEMPERATURE REGULATION: 1
FLANK PAIN: 1
LEG PAIN: 1
HALLUCINATIONS: 0
VOMITING: 1
BLOATING: 0
PANIC: 1
NIGHT SWEATS: 1
ABDOMINAL PAIN: 1
JAUNDICE: 0
LOSS OF CONSCIOUSNESS: 0
NUMBNESS: 1
JOINT SWELLING: 0
DEPRESSION: 1
WEIGHT GAIN: 0
RECTAL BLEEDING: 0
CHILLS: 1
DECREASED CONCENTRATION: 1
PARALYSIS: 0
TASTE DISTURBANCE: 0
NECK MASS: 0
EYE IRRITATION: 0
MUSCLE WEAKNESS: 1
SYNCOPE: 0
INCREASED ENERGY: 1
SORE THROAT: 0
FATIGUE: 1
TREMORS: 0
INSOMNIA: 1
ORTHOPNEA: 1
CLAUDICATION: 1
MEMORY LOSS: 0
LIGHT-HEADEDNESS: 1
SLEEP DISTURBANCES DUE TO BREATHING: 0
DOUBLE VISION: 0
ARTHRALGIAS: 1
BLOOD IN STOOL: 0
DIFFICULTY URINATING: 0

## 2017-07-18 ENCOUNTER — OFFICE VISIT (OUTPATIENT)
Dept: GASTROENTEROLOGY | Facility: CLINIC | Age: 52
End: 2017-07-18

## 2017-07-18 VITALS
TEMPERATURE: 97.9 F | HEIGHT: 63 IN | BODY MASS INDEX: 20.23 KG/M2 | DIASTOLIC BLOOD PRESSURE: 63 MMHG | HEART RATE: 44 BPM | WEIGHT: 114.2 LBS | OXYGEN SATURATION: 98 % | SYSTOLIC BLOOD PRESSURE: 100 MMHG

## 2017-07-18 DIAGNOSIS — F43.20 ADJUSTMENT DISORDER, UNSPECIFIED TYPE: ICD-10-CM

## 2017-07-18 DIAGNOSIS — K58.2 IRRITABLE BOWEL SYNDROME WITH BOTH CONSTIPATION AND DIARRHEA: ICD-10-CM

## 2017-07-18 DIAGNOSIS — K50.80 CROHN'S DISEASE OF BOTH SMALL AND LARGE INTESTINE WITHOUT COMPLICATION (H): Primary | ICD-10-CM

## 2017-07-18 ASSESSMENT — PAIN SCALES - GENERAL: PAINLEVEL: MODERATE PAIN (5)

## 2017-07-18 NOTE — LETTER
7/18/2017       RE: Melodie Dhillon  [address] [facility] [facility] [id number]     Dear Colleague,    Thank you for referring your patient, Melodie Dhillon, to the [facility] GASTROENTEROLOGY AND IBD at [facility]. Please see a copy of my visit note below.    GI CLINIC VISIT     CC/REFERRING MD: Isidoro Esqueda MD     REASON FOR follow up: Crohn's Disease     IBD history:  Age at diagnosis: 49  Extend of disease: Ileum  Disease phenotype: inflammatory  Tierney-anal disease: none  Current CD medications: Infliximab 5mg/kg every 6 weeks (decreased interval after low drug level)  Prior IBD surgeries: none  Prior IBD Medications: prednisone.     IBD monitoring:  -TPMT level: 34.2  -Thiopurine levels: none  -IFX level:      -> 34 and ab neg 6/9/2015 and 1/2016     -low drug level (<1) and no antibodies 3/2017     Please see initial consult note in 4/2015 for full details. Multiple colonoscopies by Dr. Esqueda that showed ileal inflammation. One colonoscopy also with cecal ulceration. Biopsies show non-specific inflammation in the ileum and ischemic changes in the cecum. She had been tried on steroids with minimal change in symptoms. Started on infliximab 5m/kg in February 2015 but only received 2 infusions due to nausea/vomiting. Referred to [facility] IBD clinic. Dr. Gudino reviewed pathology and one of the TI biopsies showed active chronic enteritis with pyloric metaplasia supportive of Crohn's Disease. Infliximab levels were adequate and ab neg. Restarted on Remicade early June 2015 after 2 months break.      Repeat colonoscopy 1/2016 normal (quiescant crohn's on right sided biopsies). MRE with some changes in jejunum but single balloon unremarkable.    Today:   Here to follow up in clinic. Overall issues are stable.    1. Irregular bowel habits. This seems to be one of the biggest issue. More recently struggling with constipation - goes days without a BM. Now taking miralax again.  Will have some days with many stools as well. She is taking fiber bars. Levsin every day.    2. Abdominal discomfort. Has bloating and sharp pains in upper abdomen. Worse when constipated. Seems also to be worse when lying in bed at night.    3. Nausea returned last week when she had loose stools. Also had some when constipated    4. Chronic left flank pain - persists. Unchanged. She gets significant anxiety from this.    5. Weight stable    6. Has had blurry vision - at night after taking meds    7. Anxiety - has been having panic attacks.        ROS:    Additional ROS  No fevers since November  No blurry vision, double vision or change in vision  No sore throat  No lymphadenopathy  No headache, paraesthesias, or weakness in a limb  No shortness of breath or wheezing  No chest pain or pressure  No arthralgias or myalgias  No rashes or skin changes  No odynophagia or dysphagia  No BRBPR, hematochezia, melena  No dysuria, frequency or urgency  No hot/cold intolerance or polyria  No anxiety or depression     Extra intestinal manifestations of IBD:  No uveitis/episcleritis  No aphthous ulcers    No arthritis    No erythema nodosum/pyoderma gangrenosum.      PERTINENT PAST MEDICAL HISTORY:  1. Crohn's as above  2. Cholelithiasis s/p cholecystecomy  3. Short segment Saleh's esophagus     PREVIOUS SURGERIES:  1. S/p claudine     ALLERGIES:       Allergies       Allergen     Reactions             Luminal [Phenobarbital]     Shortness Of Breath and Anaphylaxis             Penicillins     Unknown             Codeine     Swelling and Rash             PERTINENT MEDICATIONS:  Current Outpatient Prescriptions   Medication     salmeterol (SEREVENT) 50 MCG/DOSE diskus inhaler     SERTRALINE HCL PO     hyoscyamine (LEVSIN) 0.125 MG tablet     Calcium Carbonate-Vit D-Min (CALCIUM 1200 PO)     Cholecalciferol (VITAMIN D3 PO)     Esomeprazole Magnesium (NEXIUM PO)     Acetaminophen (TYLENOL PO)     DIAZEPAM PO     ondansetron  "(ZOFRAN-ODT) 4 MG disintegrating tablet     No current facility-administered medications for this visit.      SOCIAL HISTORY:          Social History                 History       Social History             Marital Status:                  Spouse Name:     N/A             Number of Children:     N/A             Years of Education:     N/A       Occupational History             Not on file.       Social History Main Topics             Smoking status:     Current Every Day Smoker -- 0.20 packs/day             Smokeless tobacco:     Never Used             Alcohol Use:     No             Drug Use:     No             Sexual Activity:     Not on file       Other Topics     Concern             Not on file       Social History Narrative               FAMILY HISTORY:  Reports many family have had \"stomach issues' - most notably vomiting. No known CD/UC or IBD. No colon cancer or colon polyps     Past/family/social history reviewed and no changes     PHYSICAL EXAMINATION:  Vitals reviewed, AFVSS   /63  Pulse (!) 44  Temp 97.9  F (36.6  C)  Ht 1.588 m (5' 2.5\")  Wt 51.8 kg (114 lb 3.2 oz)  SpO2 98%  BMI 20.55 kg/m2    Gen: aaox3, cooperative, pleasant, not dyspneic/diaphoretic, nad  HEENT: ncat, neck supple, no clad, normal op w/o ulcer/exudate, anicteric, mmm  Lymph: No axillary, submandibular, supraclavicular or inguinal lymphadenopathy  Resp/CV unremarkable  Abd: Nondistended, +bs, no hepatosplenomegaly, mild tenderness in epigastric region, no peritoneal signs  Ext: no c/c/e  Skin: warm, perfused, no jaundice     PERTINENT STUDIES:  LABS reviewed. CBC normal. LFTs normal. Albumin normal     ESR and CRP elevated in December when had PNA but normalized     ASSESSMENT/PLAN:  48 YO F with ileal Crohn's Disease now on infliximab here for follow up.      1. Crohn's disease - ileo-colitis  -all of our investigations have shown quiescent disease (ileo-colonoscopy with endoscopic healing and enteroscopy was " normal). MRE with some proximal jejunal thickening but single balloon is normal with normal biopsies. Fecal calpro normal in 4/2017  -continue IFX 5 mg/kg q 6 weeks. We will check a repeat drug level after 3 infusions  -check labs with CBC with diff, LFTs, BMP, ESR and CRP with infusion    2. Irregular stool pattern with gas pains and bloating  -I think she has some IBS in setting of IBD - discussed in detail  -continue fiber and probiotic. Recommended switching to citrucel over fiber 1 bars  -continue Levsin  -she has met with nutrition (not particularly helpful for patient) - can re-consider later  -I think she will also benefit form meeting with our GI psychologist. We talked about this again. She will continue to consider  --SHE HAS MORE CONSTIPATION CURRENTLY - WILL TRY MORE REGULAR MIRALAX     3. Weight - stable     4. History of C diff  -- s/p vanco x 2 weeks in 2015  -- recheck negative  -- will only check if having consistent diarrhea     5. Short segment Saleh's on EGD 4/2015  -no dysplasia  -repeat EGD in spring 2018     6. Chronic nausea  -resolved after stopping marijuana. Worse when constipated. Manage constipation as above.     7. Osteoporosis - per patient seen on DEXA scan  -she is following with her PCP for this  -she is currently on vitamin D replacement  -would consider bisphosphonate therapy once vitamin D replete     8. Anxiety/Depression - I think this is big driving factor in her symptoms. I strongly recommend meeting with Dr. Saunders our GI psychologist.    9. IBD healthcare maintenance:    -- Surveillance for colon cancer: Given disease, patient is due for surveillance colonoscopy in 2023  -- Bone mineral density screening: Osteoporosis as above    -- Calcium 1200mg per day and vitamin D 2000 units per day  -- Recommend that patient is up to date on all age appropriate vaccinations including yearly influenza, hepatitis B, TDaP.    -- Recommend pneumococcal vaccination (and booster every 5  years).    -- Yearly assessment for latent Tb (verbal screening and exam, PPD or QuantiFERON-Tb testing) - UTD 1/2016  -- Due to the immunosuppression in this patient, I would not advise live vaccines such as varicella/VZV, intranasal influenza, or yellow fever vaccine (if travelling).    -- Yearly skin exam to screen for skin cancer  -- Yearly pap smear     RTC 6 months or sooner as needed     Thank you for this consultation. It was a pleasure to participate in the care of this patient; please contact us with any further questions.   Again, thank you for allowing me to participate in the care of your patient.      Sincerely,    Vernon Pino MD

## 2017-07-18 NOTE — PATIENT INSTRUCTIONS
Great to see you again!    Continue the Remicade. This is working well.    You can talk to your primary care provider about starting amitriptyline at night to help with your chronic pain. It can help with your sleep    Please schedule an appointment to speak with our GI psychologist, Dr. Chelsie Saunders    You can take the miralax for the constipation    I recommend you take citrucel for fiber supplementation. This works better than fiber bars. Start at 1 tablespoon and increase up to 3 tablespoons daily    Talk with your primary care today about seeing an ophthalmologist    Follow up in 6 months

## 2017-07-18 NOTE — NURSING NOTE
Printed after visit summary given to pt along with follow up appt with Dr. Pino and Chelsie Saunders appt.   Discussed Citrucel instructions.

## 2017-07-18 NOTE — PROGRESS NOTES
GI CLINIC VISIT     CC/REFERRING MD: Isidoro Esqueda MD     REASON FOR follow up: Crohn's Disease     IBD history:  Age at diagnosis: 49  Extend of disease: Ileum  Disease phenotype: inflammatory  Tierney-anal disease: none  Current CD medications: Infliximab 5mg/kg every 6 weeks (decreased interval after low drug level)  Prior IBD surgeries: none  Prior IBD Medications: prednisone.     IBD monitoring:  -TPMT level: 34.2  -Thiopurine levels: none  -IFX level:      -> 34 and ab neg 6/9/2015 and 1/2016     -low drug level (<1) and no antibodies 3/2017     Please see initial consult note in 4/2015 for full details. Multiple colonoscopies by Dr. Esqueda that showed ileal inflammation. One colonoscopy also with cecal ulceration. Biopsies show non-specific inflammation in the ileum and ischemic changes in the cecum. She had been tried on steroids with minimal change in symptoms. Started on infliximab 5m/kg in February 2015 but only received 2 infusions due to nausea/vomiting. Referred to [facility] IBD clinic. Dr. Gudino reviewed pathology and one of the TI biopsies showed active chronic enteritis with pyloric metaplasia supportive of Crohn's Disease. Infliximab levels were adequate and ab neg. Restarted on Remicade early June 2015 after 2 months break.      Repeat colonoscopy 1/2016 normal (quiescant crohn's on right sided biopsies). MRE with some changes in jejunum but single balloon unremarkable.    Today:      Here to follow up in clinic. Overall issues are stable.    1. Irregular bowel habits. This seems to be one of the biggest issue. More recently struggling with constipation - goes days without a BM. Now taking miralax again. Will have some days with many stools as well. She is taking fiber bars. Levsin every day.    2. Abdominal discomfort. Has bloating and sharp pains in upper abdomen. Worse when constipated. Seems also to be worse when lying in bed at night.    3. Nausea returned last week when she had loose stools.  Also had some when constipated    4. Chronic left flank pain - persists. Unchanged. She gets significant anxiety from this.    5. Weight stable    6. Has had blurry vision - at night after taking meds    7. Anxiety - has been having panic attacks.              ROS:    Additional ROS  No fevers since November  No blurry vision, double vision or change in vision  No sore throat  No lymphadenopathy  No headache, paraesthesias, or weakness in a limb  No shortness of breath or wheezing  No chest pain or pressure  No arthralgias or myalgias  No rashes or skin changes  No odynophagia or dysphagia  No BRBPR, hematochezia, melena  No dysuria, frequency or urgency  No hot/cold intolerance or polyria  No anxiety or depression     Extra intestinal manifestations of IBD:  No uveitis/episcleritis  No aphthous ulcers    No arthritis    No erythema nodosum/pyoderma gangrenosum.      PERTINENT PAST MEDICAL HISTORY:  1. Crohn's as above  2. Cholelithiasis s/p cholecystecomy  3. Short segment Saleh's esophagus     PREVIOUS SURGERIES:  1. S/p claudine     ALLERGIES:       Allergies       Allergen     Reactions             Luminal [Phenobarbital]     Shortness Of Breath and Anaphylaxis             Penicillins     Unknown             Codeine     Swelling and Rash             PERTINENT MEDICATIONS:      Current Outpatient Prescriptions   Medication     salmeterol (SEREVENT) 50 MCG/DOSE diskus inhaler     SERTRALINE HCL PO     hyoscyamine (LEVSIN) 0.125 MG tablet     Calcium Carbonate-Vit D-Min (CALCIUM 1200 PO)     Cholecalciferol (VITAMIN D3 PO)     Esomeprazole Magnesium (NEXIUM PO)     Acetaminophen (TYLENOL PO)     DIAZEPAM PO     ondansetron (ZOFRAN-ODT) 4 MG disintegrating tablet     No current facility-administered medications for this visit.          SOCIAL HISTORY:          Social History                 History       Social History             Marital Status:                  Spouse Name:     N/A             Number of  "Children:     N/A             Years of Education:     N/A       Occupational History             Not on file.       Social History Main Topics             Smoking status:     Current Every Day Smoker -- 0.20 packs/day             Smokeless tobacco:     Never Used             Alcohol Use:     No             Drug Use:     No             Sexual Activity:     Not on file       Other Topics     Concern             Not on file       Social History Narrative                  FAMILY HISTORY:  Reports many family have had \"stomach issues' - most notably vomiting. No known CD/UC or IBD. No colon cancer or colon polyps        Past/family/social history reviewed and no changes     PHYSICAL EXAMINATION:  Vitals reviewed, AFVSS   /63  Pulse (!) 44  Temp 97.9  F (36.6  C)  Ht 1.588 m (5' 2.5\")  Wt 51.8 kg (114 lb 3.2 oz)  SpO2 98%  BMI 20.55 kg/m2    Gen: aaox3, cooperative, pleasant, not dyspneic/diaphoretic, nad  HEENT: ncat, neck supple, no clad, normal op w/o ulcer/exudate, anicteric, mmm  Lymph: No axillary, submandibular, supraclavicular or inguinal lymphadenopathy  Resp/CV unremarkable  Abd: Nondistended, +bs, no hepatosplenomegaly, mild tenderness in epigastric region, no peritoneal signs  Ext: no c/c/e  Skin: warm, perfused, no jaundice           PERTINENT STUDIES:    LABS reviewed. CBC normal. LFTs normal. Albumin normal     ESR and CRP elevated in December when had PNA but normalized     ASSESSMENT/PLAN:  50 YO F with ileal Crohn's Disease now on infliximab here for follow up.      1. Crohn's disease - ileo-colitis  -all of our investigations have shown quiescent disease (ileo-colonoscopy with endoscopic healing and enteroscopy was normal). MRE with some proximal jejunal thickening but single balloon is normal with normal biopsies. Fecal calpro normal in 4/2017  -continue IFX 5 mg/kg q 6 weeks. We will check a repeat drug level after 3 infusions  -check labs with CBC with diff, LFTs, BMP, ESR and CRP with " infusion    2. Irregular stool pattern with gas pains and bloating  -I think she has some IBS in setting of IBD - discussed in detail  -continue fiber and probiotic. Recommended switching to citrucel over fiber 1 bars  -continue Levsin  -she has met with nutrition (not particularly helpful for patient) - can re-consider later  -I think she will also benefit form meeting with our GI psychologist. We talked about this again. She will continue to consider  --SHE HAS MORE CONSTIPATION CURRENTLY - WILL TRY MORE REGULAR MIRALAX     3. Weight - stable     4. History of C diff  -- s/p vanco x 2 weeks in 2015  -- recheck negative  -- will only check if having consistent diarrhea     5. Short segment Saleh's on EGD 4/2015  -no dysplasia  -repeat EGD in spring 2018     6. Chronic nausea  -resolved after stopping marijuana. Worse when constipated. Manage constipation as above.     7. Osteoporosis - per patient seen on DEXA scan  -she is following with her PCP for this  -she is currently on vitamin D replacement  -would consider bisphosphonate therapy once vitamin D replete     8. Anxiety/Depression - I think this is big driving factor in her symptoms. I strongly recommend meeting with Dr. Saunders our GI psychologist.    9. IBD healthcare maintenance:    -- Surveillance for colon cancer: Given disease, patient is due for surveillance colonoscopy in 2023  -- Bone mineral density screening: Osteoporosis as above    -- Calcium 1200mg per day and vitamin D 2000 units per day  -- Recommend that patient is up to date on all age appropriate vaccinations including yearly influenza, hepatitis B, TDaP.    -- Recommend pneumococcal vaccination (and booster every 5 years).    -- Yearly assessment for latent Tb (verbal screening and exam, PPD or QuantiFERON-Tb testing) - UTD 1/2016  -- Due to the immunosuppression in this patient, I would not advise live vaccines such as varicella/VZV, intranasal influenza, or yellow fever vaccine (if  travelling).    -- Yearly skin exam to screen for skin cancer  -- Yearly pap smear     RTC 6 months or sooner as needed     Thank you for this consultation. It was a pleasure to participate in the care of this patient; please contact us with any further questions.      Vernon Pino MD    [facility] [place]  Division of Gastroenterology, Hepatology and Nutrition     Answers for HPI/ROS submitted by the patient on 7/17/2017   General Symptoms: Yes  Skin Symptoms: No  HENT Symptoms: Yes  EYE SYMPTOMS: Yes  HEART SYMPTOMS: Yes  LUNG SYMPTOMS: No  INTESTINAL SYMPTOMS: Yes  URINARY SYMPTOMS: Yes  GYNECOLOGIC SYMPTOMS: No  BREAST SYMPTOMS: No  SKELETAL SYMPTOMS: Yes  BLOOD SYMPTOMS: Yes  NERVOUS SYSTEM SYMPTOMS: Yes  MENTAL HEALTH SYMPTOMS: Yes  Loss of appetite: Yes  Weight loss: No  Weight gain: No  Fatigue: Yes  Night sweats: Yes  Chills: Yes  Increased stress: Yes  Excessive thirst: No  Feeling hot or cold when others believe the temperature is normal: Yes  Loss of height: No  Post-operative complications: No  Surgical site pain: No  Hallucinations: No  Change in or Loss of Energy: Yes  Hyperactivity: No  Confusion: No  Ear pain: No  Ear discharge: No  Hearing loss: No  Tinnitus: No  Nosebleeds: Yes  Congestion: No  Sinus pain: No  Trouble swallowing: No   Voice hoarseness: Yes  Mouth sores: No  Sore throat: No  Tooth pain: No  Gum tenderness: Yes  Bleeding gums: No  Change in taste: No  Change in sense of smell: No  Dry mouth: Yes  Hearing aid used: No  Neck lump: No  Eye pain: No  Vision loss: No  Dry eyes: No  Watery eyes: No  Eye bulging: No  Double vision: No  Flashing of lights: No  Spots: No  Floaters: No  Redness: No  Crossed eyes: No  Tunnel Vision: No  Yellowing of eyes: No  Eye irritation: No  Chest pain or pressure: Yes  Fast or irregular heartbeat: Yes  Pain in legs with walking: Yes  Swelling in feet or ankles: No  Trouble breathing while lying down: Yes  Fingers or Toes  appear blue: No  High blood pressure: No  Low blood pressure: Yes  Fainting: No  Murmurs: No  Chest pain on exertion: No  Chest pain at rest: Yes  Cramping pain in leg during exercise: Yes  Pacemaker: No  Varicose veins: No  Edema or swelling: No  Fast heart beat: No  Wake up at night with shortness of breath: No  Heart flutters: Yes  Light-headedness: Yes  Exercise intolerance: No  Heart burn or indigestion: Yes  Nausea: Yes  Vomiting: Yes  Abdominal pain: Yes  Bloating: No  Constipation: Yes  Diarrhea: Yes  Blood in stool: No  Black stools: No  Rectal or Anal pain: Yes  Fecal incontinence: Yes  Rectal bleeding: No  Yellowing of skin or eyes: No  Vomit with blood: No  Change in stools: No  Hemorrhoids: No  Trouble holding urine or incontinence: Yes  Pain or burning: No  Trouble starting or stopping: No  Increased frequency of urination: No  Blood in urine: No  Decreased frequency of urination: Yes  Frequent nighttime urination: Yes  Flank pain: Yes  Difficulty emptying bladder: No  Back pain: Yes  Muscle aches: Yes  Neck pain: No  Swollen joints: No  Joint pain: Yes  Bone pain: Yes  Muscle cramps: Yes  Muscle weakness: Yes  Joint stiffness: Yes  Bone fracture: Yes  Anemia: No  Swollen glands: No  Easy bleeding or bruising: Yes  Trouble with coordination: Yes  Dizziness or trouble with balance: Yes  Fainting or black-out spells: No  Memory loss: No  Headache: Yes  Speech problems: No blurred doses of present  Tingling: No  Tremor: No  Weakness: Yes  Difficulty walking: No  Paralysis: No  Numbness: Yes  Nervous or Anxious: Yes  Depression: Yes  Trouble sleeping: Yes  Trouble thinking or concentrating: Yes  Mood changes: Yes  Panic attacks: Yes

## 2017-07-18 NOTE — MR AVS SNAPSHOT
After Visit Summary   7/18/2017    Melodie Dhillon    MRN: [id number]           Patient Information     Date Of Birth          1965        Visit Information        Provider Department      7/18/2017 8:00 AM Vernon Pino MD  [facility]        Care Instructions    Great to see you again!    Continue the Remicade. This is working well.    You can talk to your primary care provider about starting amitriptyline at night to help with your chronic pain. It can help with your sleep    Please schedule an appointment to speak with our GI psychologist, Dr. Chelsie Saunders    You can take the miralax for the constipation    I recommend you take citrucel for fiber supplementation. This works better than fiber bars. Start at 1 tablespoon and increase up to 3 tablespoons daily    Talk with your primary care today about seeing an ophthalmologist    Follow up in 6 months           Follow-ups after your visit        Your next 10 appointments already scheduled     Aug 04, 2017  4:00 PM CDT   (Arrive by 3:45 PM)   New Patient Visit with Chelsie Saunders, PhD   [facility] ([facility])    [address] [place] [id number] [phone]            Shahzad 15, 2018  8:40 AM CST   (Arrive by 8:25 AM)   RETURN IRRITABLE BOWEL DISEASE with Vernon Pino MD   [facility] ([facility])    [address] [place] [id number] [phone]              Who to contact     Please call your clinic at [phone] to:    Ask questions about your health    Make or cancel appointments    Discuss your medicines    Learn about your test results    Speak to your doctor   If you have compliments or concerns about an experience at your clinic, or if you wish to file a complaint, please contact [facility] Patient Relations at  "[phone] or email us at [email or web address]         Additional Information About Your Visit        K[employer]yssey Thera Information     [employer] gives you secure access to your electronic health record. If you see a primary care provider, you can also send messages to your care team and make appointments. If you have questions, please call your primary care clinic.  If you do not have a primary care provider, please call [phone] and they will assist you.      [employer] is an electronic gateway that provides easy, online access to your medical records. With [employer], you can request a clinic appointment, read your test results, renew a prescription or communicate with your care team.     To access your existing account, please contact your [facility] or call [phone] for assistance.        Care EveryWhere ID     This is your Care EveryWhere ID. This could be used by other organizations to access your [facility] medical records  [id number]        Your Vitals Were     Pulse Temperature Height Pulse Oximetry BMI (Body Mass Index)       44 97.9  F (36.6  C) 1.588 m (5' 2.5\") 98% 20.55 kg/m2        Blood Pressure from Last 3 Encounters:   07/18/17 100/63   01/17/17 92/55   05/17/16 117/75    Weight from Last 3 Encounters:   07/18/17 51.8 kg (114 lb 3.2 oz)   01/17/17 52.2 kg (115 lb)   05/17/16 51.5 kg (113 lb 9.6 oz)              Today, you had the following     No orders found for display         Today's Medication Changes          These changes are accurate as of: 7/18/17  9:04 AM.  If you have any questions, ask your nurse or doctor.               Stop taking these medicines if you haven't already. Please contact your care team if you have questions.     LEXAPRO PO   Stopped by:  Vernon Pino MD                    Primary Care Provider Office Phone # Fax #    Sami Wright [phone]       [facility] [address]  [facility] [address]        Equal Access to Services     [facility] NOEMI : Hadii bruna moore dima Sozachary, waaxda luqadaha, qaybta shabnamstephaniehannah escalera, parish lea haywade leemauroadelita lagos . So [facility] [phone].    ATENCIÓN: Si habla español, tiene a osman disposición servicios gratuitos de asistencia lingüística. Llame al [phone].    We comply with applicable federal civil rights laws and [place] laws. We do not discriminate on the basis of race, color, national origin, age, disability sex, sexual orientation or gender identity.            Thank you!     Thank you for choosing [facility]  for your care. Our goal is always to provide you with excellent care. Hearing back from our patients is one way we can continue to improve our services. Please take a few minutes to complete the written survey that you may receive in the mail after your visit with us. Thank you!             Your Updated Medication List - Protect others around you: Learn how to safely use, store and throw away your medicines at www.disposemymeds.org.          This list is accurate as of: 7/18/17  9:04 AM.  Always use your most recent med list.                   Brand Name Dispense Instructions for use Diagnosis    CALCIUM 1200 PO      Take by mouth daily        DIAZEPAM PO      Take 5 mg by mouth daily as needed for anxiety        hyoscyamine 0.125 MG tablet    LEVSIN    90 tablet    Take 1 tablet (125 mcg) by mouth every 6 hours as needed for cramping    Abdominal cramping       NEXIUM PO      Take 20 mg by mouth every morning (before breakfast)        ondansetron 4 MG ODT tab    ZOFRAN-ODT    120 tablet    Take 1 tablet (4 mg) by mouth every 8 hours as needed for nausea (take 1-2 tablets as needed for nausea)    Nausea, Colitis, Regional enteritis of unspecified site       salmeterol 50 MCG/DOSE diskus inhaler    SEREVENT     Inhale 1 puff into the lungs 2 times daily        SERTRALINE HCL PO      Take 100 mg by  mouth daily        TYLENOL PO      Take 325 mg by mouth        VITAMIN D3 PO      Take 2,000 Units by mouth daily

## 2017-07-18 NOTE — NURSING NOTE
"Chief Complaint   Patient presents with     RECHECK     f/u       Vitals:    07/18/17 0812   BP: 100/63   Pulse: (!) 44   Temp: 97.9  F (36.6  C)   SpO2: 98%   Weight: 114 lb 3.2 oz   Height: 5' 2.5\"       Body mass index is 20.55 kg/(m^2).  Mc Mccabe MA                       "

## 2017-07-24 ENCOUNTER — CARE COORDINATION (OUTPATIENT)
Dept: GASTROENTEROLOGY | Facility: CLINIC | Age: 52
End: 2017-07-24

## 2017-07-24 DIAGNOSIS — K50.90 CROHN'S DISEASE (H): Primary | ICD-10-CM

## 2017-07-24 RX ORDER — ONDANSETRON 2 MG/ML
4 INJECTION INTRAMUSCULAR; INTRAVENOUS ONCE
Status: CANCELLED
Start: 2017-07-24 | End: 2017-07-24

## 2017-07-24 RX ORDER — DIPHENHYDRAMINE HCL 25 MG
25 CAPSULE ORAL
Status: CANCELLED
Start: 2017-07-24

## 2017-07-24 RX ORDER — METHYLPREDNISOLONE SODIUM SUCCINATE 125 MG/2ML
40 INJECTION, POWDER, LYOPHILIZED, FOR SOLUTION INTRAMUSCULAR; INTRAVENOUS
Status: CANCELLED | OUTPATIENT
Start: 2017-07-24

## 2017-07-24 RX ORDER — ACETAMINOPHEN 325 MG/1
650 TABLET ORAL
Status: CANCELLED
Start: 2017-07-24

## 2017-07-24 NOTE — PROGRESS NOTES
New therapy plan entered as therapy plan .  Discontinued the bolus bag of normal saline per request from [facility] home infusion pharmacist.  Standing lab orders for cbd with diff,esr, crp and heaptic panel  Labs to be drawn day of remicade infusion prior to start of infusion.  Communicated  new therapy plan for pt to Cyn.

## 2017-07-31 ENCOUNTER — MEDICAL CORRESPONDENCE (OUTPATIENT)
Dept: HEALTH INFORMATION MANAGEMENT | Facility: CLINIC | Age: 52
End: 2017-07-31

## 2017-08-01 ENCOUNTER — CARE COORDINATION (OUTPATIENT)
Dept: GASTROENTEROLOGY | Facility: CLINIC | Age: 52
End: 2017-08-01

## 2017-08-01 ENCOUNTER — MEDICAL CORRESPONDENCE (OUTPATIENT)
Dept: HEALTH INFORMATION MANAGEMENT | Facility: CLINIC | Age: 52
End: 2017-08-01

## 2017-08-01 NOTE — PROGRESS NOTES
Pt called to discuss coding of her infusions and her insurance coverage.   Called pt back and told her that our clinic does not do coding and she should start with the number for the home infusion and if unable to assist her that she should call author of this note back to discuss.

## 2017-08-04 LAB
ALBUMIN SERPL-MCNC: 3.6 G/DL (ref 3.4–5)
ALP SERPL-CCNC: 94 U/L (ref 40–150)
ALT SERPL W P-5'-P-CCNC: 18 U/L (ref 0–50)
AST SERPL W P-5'-P-CCNC: 20 U/L (ref 0–45)
BASOPHILS # BLD AUTO: 0 10E9/L (ref 0–0.2)
BASOPHILS NFR BLD AUTO: 0.3 %
BILIRUB DIRECT SERPL-MCNC: <0.1 MG/DL (ref 0–0.2)
BILIRUB SERPL-MCNC: 0.5 MG/DL (ref 0.2–1.3)
CRP SERPL-MCNC: 6.8 MG/L (ref 0–8)
DIFFERENTIAL METHOD BLD: ABNORMAL
EOSINOPHIL # BLD AUTO: 0.1 10E9/L (ref 0–0.7)
EOSINOPHIL NFR BLD AUTO: 1.3 %
ERYTHROCYTE [DISTWIDTH] IN BLOOD BY AUTOMATED COUNT: 12.6 % (ref 10–15)
ERYTHROCYTE [SEDIMENTATION RATE] IN BLOOD BY WESTERGREN METHOD: 15 MM/H (ref 0–30)
HCT VFR BLD AUTO: 44.4 % (ref 35–47)
HGB BLD-MCNC: 15.2 G/DL (ref 11.7–15.7)
IMM GRANULOCYTES # BLD: 0 10E9/L (ref 0–0.4)
IMM GRANULOCYTES NFR BLD: 0.1 %
LYMPHOCYTES # BLD AUTO: 1.7 10E9/L (ref 0.8–5.3)
LYMPHOCYTES NFR BLD AUTO: 21.8 %
MCH RBC QN AUTO: 33.3 PG (ref 26.5–33)
MCHC RBC AUTO-ENTMCNC: 34.2 G/DL (ref 31.5–36.5)
MCV RBC AUTO: 97 FL (ref 78–100)
MONOCYTES # BLD AUTO: 0.3 10E9/L (ref 0–1.3)
MONOCYTES NFR BLD AUTO: 3.8 %
NEUTROPHILS # BLD AUTO: 5.8 10E9/L (ref 1.6–8.3)
NEUTROPHILS NFR BLD AUTO: 72.7 %
NRBC # BLD AUTO: 0 10*3/UL
NRBC BLD AUTO-RTO: 0 /100
PLATELET # BLD AUTO: 189 10E9/L (ref 150–450)
PROT SERPL-MCNC: 7 G/DL (ref 6.8–8.8)
RBC # BLD AUTO: 4.57 10E12/L (ref 3.8–5.2)
WBC # BLD AUTO: 7.9 10E9/L (ref 4–11)

## 2017-08-04 PROCEDURE — 80076 HEPATIC FUNCTION PANEL: CPT | Performed by: INTERNAL MEDICINE

## 2017-08-04 PROCEDURE — 86140 C-REACTIVE PROTEIN: CPT | Performed by: INTERNAL MEDICINE

## 2017-08-04 PROCEDURE — 85652 RBC SED RATE AUTOMATED: CPT | Performed by: INTERNAL MEDICINE

## 2017-08-04 PROCEDURE — 85025 COMPLETE CBC W/AUTO DIFF WBC: CPT | Performed by: INTERNAL MEDICINE

## 2017-08-08 NOTE — PROGRESS NOTES
This is a recent snapshot of the patient's [facility] medical record.  For current drug dose and complete information and questions, call [phone] or In [facility] pool, fv home infusion ([id number])  CSN Number:  [id number]

## 2017-08-22 ENCOUNTER — CARE COORDINATION (OUTPATIENT)
Dept: GASTROENTEROLOGY | Facility: CLINIC | Age: 52
End: 2017-08-22

## 2017-08-22 DIAGNOSIS — K50.90 CROHN'S DISEASE (H): Primary | ICD-10-CM

## 2017-08-22 NOTE — PROGRESS NOTES
Edited therapy plan to add infliximab level to be drawn day of next infusoin poror to start of remicade. Pt needs to sign back of form.  Faxed to [facility]  Rest of form tubed to [facility] lab

## 2017-09-07 ENCOUNTER — MEDICAL CORRESPONDENCE (OUTPATIENT)
Dept: HEALTH INFORMATION MANAGEMENT | Facility: CLINIC | Age: 52
End: 2017-09-07

## 2017-09-15 LAB
ALBUMIN SERPL-MCNC: 3.9 G/DL (ref 3.4–5)
ALP SERPL-CCNC: 93 U/L (ref 40–150)
ALT SERPL W P-5'-P-CCNC: 18 U/L (ref 0–50)
AST SERPL W P-5'-P-CCNC: 16 U/L (ref 0–45)
BASOPHILS # BLD AUTO: 0 10E9/L (ref 0–0.2)
BASOPHILS NFR BLD AUTO: 0.5 %
BILIRUB DIRECT SERPL-MCNC: 0.1 MG/DL (ref 0–0.2)
BILIRUB SERPL-MCNC: 0.7 MG/DL (ref 0.2–1.3)
CRP SERPL-MCNC: 3.5 MG/L (ref 0–8)
DIFFERENTIAL METHOD BLD: NORMAL
EOSINOPHIL # BLD AUTO: 0.1 10E9/L (ref 0–0.7)
EOSINOPHIL NFR BLD AUTO: 1.2 %
ERYTHROCYTE [DISTWIDTH] IN BLOOD BY AUTOMATED COUNT: 12.2 % (ref 10–15)
ERYTHROCYTE [SEDIMENTATION RATE] IN BLOOD BY WESTERGREN METHOD: 18 MM/H (ref 0–30)
HCT VFR BLD AUTO: 41.6 % (ref 35–47)
HGB BLD-MCNC: 14.3 G/DL (ref 11.7–15.7)
IMM GRANULOCYTES # BLD: 0 10E9/L (ref 0–0.4)
IMM GRANULOCYTES NFR BLD: 0 %
LYMPHOCYTES # BLD AUTO: 2.5 10E9/L (ref 0.8–5.3)
LYMPHOCYTES NFR BLD AUTO: 43.7 %
MCH RBC QN AUTO: 32.8 PG (ref 26.5–33)
MCHC RBC AUTO-ENTMCNC: 34.4 G/DL (ref 31.5–36.5)
MCV RBC AUTO: 95 FL (ref 78–100)
MONOCYTES # BLD AUTO: 0.3 10E9/L (ref 0–1.3)
MONOCYTES NFR BLD AUTO: 5.7 %
NEUTROPHILS # BLD AUTO: 2.8 10E9/L (ref 1.6–8.3)
NEUTROPHILS NFR BLD AUTO: 48.9 %
NRBC # BLD AUTO: 0 10*3/UL
NRBC BLD AUTO-RTO: 0 /100
PLATELET # BLD AUTO: 200 10E9/L (ref 150–450)
PROT SERPL-MCNC: 7.3 G/DL (ref 6.8–8.8)
RBC # BLD AUTO: 4.36 10E12/L (ref 3.8–5.2)
WBC # BLD AUTO: 5.8 10E9/L (ref 4–11)

## 2017-09-15 PROCEDURE — 85652 RBC SED RATE AUTOMATED: CPT | Performed by: INTERNAL MEDICINE

## 2017-09-15 PROCEDURE — 86140 C-REACTIVE PROTEIN: CPT | Performed by: INTERNAL MEDICINE

## 2017-09-15 PROCEDURE — 80076 HEPATIC FUNCTION PANEL: CPT | Performed by: INTERNAL MEDICINE

## 2017-09-15 PROCEDURE — 85025 COMPLETE CBC W/AUTO DIFF WBC: CPT | Performed by: INTERNAL MEDICINE

## 2017-09-18 ENCOUNTER — MEDICAL CORRESPONDENCE (OUTPATIENT)
Dept: HEALTH INFORMATION MANAGEMENT | Facility: CLINIC | Age: 52
End: 2017-09-18

## 2017-09-19 ENCOUNTER — MEDICAL CORRESPONDENCE (OUTPATIENT)
Dept: HEALTH INFORMATION MANAGEMENT | Facility: CLINIC | Age: 52
End: 2017-09-19

## 2017-09-27 LAB — LAB SCANNED RESULT: NORMAL

## 2017-10-05 NOTE — PROGRESS NOTES
This is a recent snapshot of the patient's [facility] medical record.  For current drug dose and complete information and questions, call [phone] or In [facility] pool, fv home infusion ([id number])  CSN Number:  [id number]

## 2017-10-27 LAB
ALBUMIN SERPL-MCNC: 3.7 G/DL (ref 3.4–5)
ALP SERPL-CCNC: 103 U/L (ref 40–150)
ALT SERPL W P-5'-P-CCNC: 16 U/L (ref 0–50)
AST SERPL W P-5'-P-CCNC: 17 U/L (ref 0–45)
BASOPHILS # BLD AUTO: 0 10E9/L (ref 0–0.2)
BASOPHILS NFR BLD AUTO: 0.4 %
BILIRUB DIRECT SERPL-MCNC: <0.1 MG/DL (ref 0–0.2)
BILIRUB SERPL-MCNC: 0.4 MG/DL (ref 0.2–1.3)
CRP SERPL-MCNC: <2.9 MG/L (ref 0–8)
DIFFERENTIAL METHOD BLD: ABNORMAL
EOSINOPHIL # BLD AUTO: 0.1 10E9/L (ref 0–0.7)
EOSINOPHIL NFR BLD AUTO: 1.2 %
ERYTHROCYTE [DISTWIDTH] IN BLOOD BY AUTOMATED COUNT: 12.3 % (ref 10–15)
ERYTHROCYTE [SEDIMENTATION RATE] IN BLOOD BY WESTERGREN METHOD: 17 MM/H (ref 0–30)
HCT VFR BLD AUTO: 42.5 % (ref 35–47)
HGB BLD-MCNC: 14.7 G/DL (ref 11.7–15.7)
IMM GRANULOCYTES # BLD: 0 10E9/L (ref 0–0.4)
IMM GRANULOCYTES NFR BLD: 0 %
LYMPHOCYTES # BLD AUTO: 2 10E9/L (ref 0.8–5.3)
LYMPHOCYTES NFR BLD AUTO: 29.1 %
MCH RBC QN AUTO: 33.4 PG (ref 26.5–33)
MCHC RBC AUTO-ENTMCNC: 34.6 G/DL (ref 31.5–36.5)
MCV RBC AUTO: 97 FL (ref 78–100)
MONOCYTES # BLD AUTO: 0.4 10E9/L (ref 0–1.3)
MONOCYTES NFR BLD AUTO: 5.9 %
NEUTROPHILS # BLD AUTO: 4.4 10E9/L (ref 1.6–8.3)
NEUTROPHILS NFR BLD AUTO: 63.4 %
NRBC # BLD AUTO: 0 10*3/UL
NRBC BLD AUTO-RTO: 0 /100
PLATELET # BLD AUTO: 181 10E9/L (ref 150–450)
PROT SERPL-MCNC: 7.3 G/DL (ref 6.8–8.8)
RBC # BLD AUTO: 4.4 10E12/L (ref 3.8–5.2)
WBC # BLD AUTO: 6.9 10E9/L (ref 4–11)

## 2017-10-27 PROCEDURE — 80076 HEPATIC FUNCTION PANEL: CPT | Performed by: INTERNAL MEDICINE

## 2017-10-27 PROCEDURE — 86140 C-REACTIVE PROTEIN: CPT | Performed by: INTERNAL MEDICINE

## 2017-10-27 PROCEDURE — 85025 COMPLETE CBC W/AUTO DIFF WBC: CPT | Performed by: INTERNAL MEDICINE

## 2017-10-27 PROCEDURE — 85652 RBC SED RATE AUTOMATED: CPT | Performed by: INTERNAL MEDICINE

## 2017-12-05 ENCOUNTER — CARE COORDINATION (OUTPATIENT)
Dept: GASTROENTEROLOGY | Facility: CLINIC | Age: 52
End: 2017-12-05

## 2017-12-07 ENCOUNTER — CARE COORDINATION (OUTPATIENT)
Dept: GASTROENTEROLOGY | Facility: CLINIC | Age: 52
End: 2017-12-07

## 2017-12-07 ENCOUNTER — HOME INFUSION (PRE-WILLOW HOME INFUSION) (OUTPATIENT)
Dept: PHARMACY | Facility: CLINIC | Age: 52
End: 2017-12-07

## 2017-12-08 ENCOUNTER — HOME INFUSION (PRE-WILLOW HOME INFUSION) (OUTPATIENT)
Dept: PHARMACY | Facility: CLINIC | Age: 52
End: 2017-12-08

## 2017-12-08 ENCOUNTER — MEDICAL CORRESPONDENCE (OUTPATIENT)
Dept: HEALTH INFORMATION MANAGEMENT | Facility: CLINIC | Age: 52
End: 2017-12-08

## 2017-12-08 LAB
ALBUMIN SERPL-MCNC: 3.9 G/DL (ref 3.4–5)
ALP SERPL-CCNC: 93 U/L (ref 40–150)
ALT SERPL W P-5'-P-CCNC: 14 U/L (ref 0–50)
AST SERPL W P-5'-P-CCNC: 19 U/L (ref 0–45)
BASOPHILS # BLD AUTO: 0 10E9/L (ref 0–0.2)
BASOPHILS NFR BLD AUTO: 0.5 %
BILIRUB DIRECT SERPL-MCNC: <0.1 MG/DL (ref 0–0.2)
BILIRUB SERPL-MCNC: 0.6 MG/DL (ref 0.2–1.3)
CRP SERPL-MCNC: 6.7 MG/L (ref 0–8)
DIFFERENTIAL METHOD BLD: NORMAL
EOSINOPHIL # BLD AUTO: 0.1 10E9/L (ref 0–0.7)
EOSINOPHIL NFR BLD AUTO: 1.6 %
ERYTHROCYTE [DISTWIDTH] IN BLOOD BY AUTOMATED COUNT: 12.4 % (ref 10–15)
ERYTHROCYTE [SEDIMENTATION RATE] IN BLOOD BY WESTERGREN METHOD: 17 MM/H (ref 0–30)
HCT VFR BLD AUTO: 39.8 % (ref 35–47)
HGB BLD-MCNC: 13.7 G/DL (ref 11.7–15.7)
IMM GRANULOCYTES # BLD: 0 10E9/L (ref 0–0.4)
IMM GRANULOCYTES NFR BLD: 0.2 %
LYMPHOCYTES # BLD AUTO: 2.4 10E9/L (ref 0.8–5.3)
LYMPHOCYTES NFR BLD AUTO: 39.2 %
MCH RBC QN AUTO: 32.9 PG (ref 26.5–33)
MCHC RBC AUTO-ENTMCNC: 34.4 G/DL (ref 31.5–36.5)
MCV RBC AUTO: 96 FL (ref 78–100)
MONOCYTES # BLD AUTO: 0.4 10E9/L (ref 0–1.3)
MONOCYTES NFR BLD AUTO: 6.6 %
NEUTROPHILS # BLD AUTO: 3.2 10E9/L (ref 1.6–8.3)
NEUTROPHILS NFR BLD AUTO: 51.9 %
NRBC # BLD AUTO: 0 10*3/UL
NRBC BLD AUTO-RTO: 0 /100
PLATELET # BLD AUTO: 193 10E9/L (ref 150–450)
PROT SERPL-MCNC: 7.3 G/DL (ref 6.8–8.8)
RBC # BLD AUTO: 4.16 10E12/L (ref 3.8–5.2)
WBC # BLD AUTO: 6.2 10E9/L (ref 4–11)

## 2017-12-08 PROCEDURE — 85652 RBC SED RATE AUTOMATED: CPT | Performed by: INTERNAL MEDICINE

## 2017-12-08 PROCEDURE — 86140 C-REACTIVE PROTEIN: CPT | Performed by: INTERNAL MEDICINE

## 2017-12-08 PROCEDURE — 80076 HEPATIC FUNCTION PANEL: CPT | Performed by: INTERNAL MEDICINE

## 2017-12-08 PROCEDURE — 85025 COMPLETE CBC W/AUTO DIFF WBC: CPT | Performed by: INTERNAL MEDICINE

## 2017-12-08 NOTE — PROGRESS NOTES
This is a recent snapshot of the patient's [facility] medical record.  For current drug dose and complete information and questions, call [phone] or In [facility] pool, fv home infusion ([id number])  CSN Number:  [id number]

## 2017-12-11 NOTE — PROGRESS NOTES
This is a recent snapshot of the patient's [facility] medical record.  For current drug dose and complete information and questions, call [phone] or In [facility] pool, fv home infusion ([id number])  CSN Number:  [id number]

## 2017-12-24 ENCOUNTER — HEALTH MAINTENANCE LETTER (OUTPATIENT)
Age: 52
End: 2017-12-24

## 2018-01-18 ENCOUNTER — HOME INFUSION (PRE-WILLOW HOME INFUSION) (OUTPATIENT)
Dept: PHARMACY | Facility: CLINIC | Age: 53
End: 2018-01-18

## 2018-01-19 ENCOUNTER — HOME INFUSION (PRE-WILLOW HOME INFUSION) (OUTPATIENT)
Dept: PHARMACY | Facility: CLINIC | Age: 53
End: 2018-01-19

## 2018-01-19 LAB
ALBUMIN SERPL-MCNC: 3.5 G/DL (ref 3.4–5)
ALP SERPL-CCNC: 88 U/L (ref 40–150)
ALT SERPL W P-5'-P-CCNC: 15 U/L (ref 0–50)
AST SERPL W P-5'-P-CCNC: 16 U/L (ref 0–45)
BASOPHILS # BLD AUTO: 0 10E9/L (ref 0–0.2)
BASOPHILS NFR BLD AUTO: 0.7 %
BILIRUB DIRECT SERPL-MCNC: <0.1 MG/DL (ref 0–0.2)
BILIRUB SERPL-MCNC: 0.4 MG/DL (ref 0.2–1.3)
CRP SERPL-MCNC: 7.8 MG/L (ref 0–8)
DIFFERENTIAL METHOD BLD: NORMAL
EOSINOPHIL # BLD AUTO: 0.1 10E9/L (ref 0–0.7)
EOSINOPHIL NFR BLD AUTO: 1.6 %
ERYTHROCYTE [DISTWIDTH] IN BLOOD BY AUTOMATED COUNT: 12.5 % (ref 10–15)
ERYTHROCYTE [SEDIMENTATION RATE] IN BLOOD BY WESTERGREN METHOD: 19 MM/H (ref 0–30)
HCT VFR BLD AUTO: 41 % (ref 35–47)
HGB BLD-MCNC: 13.5 G/DL (ref 11.7–15.7)
IMM GRANULOCYTES # BLD: 0 10E9/L (ref 0–0.4)
IMM GRANULOCYTES NFR BLD: 0.2 %
LYMPHOCYTES # BLD AUTO: 2.3 10E9/L (ref 0.8–5.3)
LYMPHOCYTES NFR BLD AUTO: 38.2 %
MCH RBC QN AUTO: 32.6 PG (ref 26.5–33)
MCHC RBC AUTO-ENTMCNC: 32.9 G/DL (ref 31.5–36.5)
MCV RBC AUTO: 99 FL (ref 78–100)
MONOCYTES # BLD AUTO: 0.4 10E9/L (ref 0–1.3)
MONOCYTES NFR BLD AUTO: 6.3 %
NEUTROPHILS # BLD AUTO: 3.2 10E9/L (ref 1.6–8.3)
NEUTROPHILS NFR BLD AUTO: 53 %
NRBC # BLD AUTO: 0 10*3/UL
NRBC BLD AUTO-RTO: 0 /100
PLATELET # BLD AUTO: 199 10E9/L (ref 150–450)
PROT SERPL-MCNC: 6.9 G/DL (ref 6.8–8.8)
RBC # BLD AUTO: 4.14 10E12/L (ref 3.8–5.2)
WBC # BLD AUTO: 6.1 10E9/L (ref 4–11)

## 2018-01-19 PROCEDURE — 85652 RBC SED RATE AUTOMATED: CPT | Performed by: INTERNAL MEDICINE

## 2018-01-19 PROCEDURE — 80076 HEPATIC FUNCTION PANEL: CPT | Performed by: INTERNAL MEDICINE

## 2018-01-19 PROCEDURE — 86140 C-REACTIVE PROTEIN: CPT | Performed by: INTERNAL MEDICINE

## 2018-01-19 PROCEDURE — 85025 COMPLETE CBC W/AUTO DIFF WBC: CPT | Performed by: INTERNAL MEDICINE

## 2018-01-19 NOTE — PROGRESS NOTES
This is a recent snapshot of the patient's [facility] medical record.  For current drug dose and complete information and questions, call [phone] or In [facility] pool, fv home infusion ([id number])  CSN Number:  [id number]

## 2018-01-22 NOTE — PROGRESS NOTES
This is a recent snapshot of the patient's [facility] medical record.  For current drug dose and complete information and questions, call [phone] or In [facility] pool, fv home infusion ([id number])  CSN Number:  [id number]

## 2018-02-09 ENCOUNTER — CARE COORDINATION (OUTPATIENT)
Dept: GASTROENTEROLOGY | Facility: CLINIC | Age: 53
End: 2018-02-09

## 2018-02-09 NOTE — PROGRESS NOTES
Called [facility] to start the process for pt to receive home infusions.  Pt does not have  insurance on file. Called and left a message for pt to update her insurance      When insurance entered will send demographic, weight, insurance, clinical info and infusion orders along with reaction hypersensitivity insts to  fax.

## 2018-02-13 ENCOUNTER — CARE COORDINATION (OUTPATIENT)
Dept: GASTROENTEROLOGY | Facility: CLINIC | Age: 53
End: 2018-02-13

## 2018-02-13 ENCOUNTER — TELEPHONE (OUTPATIENT)
Dept: GASTROENTEROLOGY | Facility: CLINIC | Age: 53
End: 2018-02-13

## 2018-02-13 DIAGNOSIS — K22.70 BARRETT'S ESOPHAGUS WITHOUT DYSPLASIA: Primary | ICD-10-CM

## 2018-02-14 ENCOUNTER — CARE COORDINATION (OUTPATIENT)
Dept: GASTROENTEROLOGY | Facility: CLINIC | Age: 53
End: 2018-02-14

## 2018-02-14 NOTE — PROGRESS NOTES
Pt faxed [facility] disability form.  Unable to complete as pt has not been seen since July 2017.  Asked pt to call to schedule an appt. Unable to determine  her symptoms since the last appt was in July 2017.  Left message for pt to call to schedule appt with Ms. Davis or Dr. Pino at earliest convenience.

## 2018-02-15 ENCOUNTER — CARE COORDINATION (OUTPATIENT)
Dept: GASTROENTEROLOGY | Facility: CLINIC | Age: 53
End: 2018-02-15

## 2018-02-20 ENCOUNTER — CARE COORDINATION (OUTPATIENT)
Dept: GASTROENTEROLOGY | Facility: CLINIC | Age: 53
End: 2018-02-20

## 2018-02-21 ENCOUNTER — CARE COORDINATION (OUTPATIENT)
Dept: GASTROENTEROLOGY | Facility: CLINIC | Age: 53
End: 2018-02-21

## 2018-02-28 ENCOUNTER — TELEPHONE (OUTPATIENT)
Dept: GASTROENTEROLOGY | Facility: OUTPATIENT CENTER | Age: 53
End: 2018-02-28

## 2018-02-28 ENCOUNTER — CARE COORDINATION (OUTPATIENT)
Dept: GASTROENTEROLOGY | Facility: CLINIC | Age: 53
End: 2018-02-28

## 2018-02-28 NOTE — PROGRESS NOTES
Pt approved for remicade and agency will reach out to pt to set up appt.  Discussed  therapy plan with [facility] pharmacist Manda.  Requested infusion visit report be faxed after every infusion.

## 2018-02-28 NOTE — PROGRESS NOTES
Called Wendy at [facility] and left a message with pt's weight.  Pt missed clinic visit. Only item needed from [facility] was weight  Asked  for a call back to discuss update on progress of home infusion.

## 2018-02-28 NOTE — TELEPHONE ENCOUNTER
Patient taking any blood thinners ? no    Heart disease ? denies    Lung disease ?denies      Sleep apnea ?denies    Diabetic ?denies    Kidney disease ?denies    Dialysis ? n/a    Electronic implanted medical devices ?denies    Are you taking any narcotic pain medication ? no  What is your daily dosage ?    PTSD ? n/a    Prep instructions reviewed with patient ? Instructions,  policy, MAC sedation plan reviewed. Advised patient o have someone stay with her post exam    Pharmacy : n/a    Indication for procedure :Saleh's esophagus without dysplasia [K22.70]     Referring provider : Self     Arrival Time :  Instructed patient to arrive at 12:30 PM

## 2018-03-02 ENCOUNTER — MEDICAL CORRESPONDENCE (OUTPATIENT)
Dept: HEALTH INFORMATION MANAGEMENT | Facility: CLINIC | Age: 53
End: 2018-03-02

## 2018-03-02 ENCOUNTER — TRANSFERRED RECORDS (OUTPATIENT)
Dept: HEALTH INFORMATION MANAGEMENT | Facility: CLINIC | Age: 53
End: 2018-03-02

## 2018-03-05 ENCOUNTER — CARE COORDINATION (OUTPATIENT)
Dept: GASTROENTEROLOGY | Facility: CLINIC | Age: 53
End: 2018-03-05

## 2018-03-05 NOTE — PROGRESS NOTES
Patient called to inform us that she received a call from someone at our clinic that she can no longer be seen by [facility].  Pt not sure who this person was.  Pt thinks might be the financial counselor.  Let pt know will see what I can do to find out what is happening.  In basket message sent to financial counselor.

## 2018-03-06 ENCOUNTER — CARE COORDINATION (OUTPATIENT)
Dept: GASTROENTEROLOGY | Facility: CLINIC | Age: 53
End: 2018-03-06

## 2018-03-07 ENCOUNTER — DOCUMENTATION ONLY (OUTPATIENT)
Dept: GASTROENTEROLOGY | Facility: OUTPATIENT CENTER | Age: 53
End: 2018-03-07
Payer: COMMERCIAL

## 2018-03-07 ENCOUNTER — TRANSFERRED RECORDS (OUTPATIENT)
Dept: HEALTH INFORMATION MANAGEMENT | Facility: CLINIC | Age: 53
End: 2018-03-07

## 2018-03-07 DIAGNOSIS — K21.9 GASTROESOPHAGEAL REFLUX DISEASE WITHOUT ESOPHAGITIS: Primary | ICD-10-CM

## 2018-03-07 RX ORDER — ESOMEPRAZOLE MAGNESIUM 40 MG/1
40 CAPSULE, DELAYED RELEASE ORAL
Qty: 180 CAPSULE | Refills: 3 | Status: SHIPPED | OUTPATIENT
Start: 2018-03-07

## 2018-03-08 NOTE — PROGRESS NOTES
Called pt back.  Pt has an insurance that our clinic is out of network with a very high co pay.  Pt is going to be seen at [facility] due to her insurance change.

## 2018-03-09 LAB — COPATH REPORT: NORMAL

## 2018-03-26 ENCOUNTER — TRANSFERRED RECORDS (OUTPATIENT)
Dept: HEALTH INFORMATION MANAGEMENT | Facility: CLINIC | Age: 53
End: 2018-03-26

## 2018-03-27 ENCOUNTER — CARE COORDINATION (OUTPATIENT)
Dept: GASTROENTEROLOGY | Facility: CLINIC | Age: 53
End: 2018-03-27

## 2018-04-16 ENCOUNTER — CARE COORDINATION (OUTPATIENT)
Dept: GASTROENTEROLOGY | Facility: CLINIC | Age: 53
End: 2018-04-16

## 2018-04-25 ENCOUNTER — MEDICAL CORRESPONDENCE (OUTPATIENT)
Dept: HEALTH INFORMATION MANAGEMENT | Facility: CLINIC | Age: 53
End: 2018-04-25

## 2018-05-02 ENCOUNTER — DOCUMENTATION ONLY (OUTPATIENT)
Dept: GASTROENTEROLOGY | Facility: CLINIC | Age: 53
End: 2018-05-02

## 2018-07-06 ENCOUNTER — TRANSFERRED RECORDS (OUTPATIENT)
Dept: HEALTH INFORMATION MANAGEMENT | Facility: CLINIC | Age: 53
End: 2018-07-06

## 2018-08-07 ENCOUNTER — CARE COORDINATION (OUTPATIENT)
Dept: GASTROENTEROLOGY | Facility: CLINIC | Age: 53
End: 2018-08-07

## 2018-08-07 NOTE — PROGRESS NOTES
Receiving lab results fro [facility] through [place].  Dr. Pino is not ordering pt's infusion as pt has not een seen in GI since March and insurance does not cover for her to be see at our clinic.  Called [facility] to make sure the ordering GI of infusions is receiving the lab results and signing as our clinic is not ordering provider of labs.  Afsaneh will contact the [facility] lab.  Will scan lab results into the chart.

## 2018-08-24 RX ORDER — METHYLPREDNISOLONE SODIUM SUCCINATE 125 MG/2ML
40 INJECTION, POWDER, LYOPHILIZED, FOR SOLUTION INTRAMUSCULAR; INTRAVENOUS
Status: CANCELLED | OUTPATIENT
Start: 2018-08-24

## 2018-08-24 RX ORDER — ACETAMINOPHEN 325 MG/1
650 TABLET ORAL
Status: CANCELLED
Start: 2018-08-24

## 2018-08-24 RX ORDER — DIPHENHYDRAMINE HCL 25 MG
25 CAPSULE ORAL
Status: CANCELLED
Start: 2018-08-24

## 2018-08-24 RX ORDER — ONDANSETRON 2 MG/ML
4 INJECTION INTRAMUSCULAR; INTRAVENOUS ONCE
Status: CANCELLED
Start: 2018-08-24 | End: 2018-08-24

## 2018-09-27 ENCOUNTER — TRANSFERRED RECORDS (OUTPATIENT)
Dept: HEALTH INFORMATION MANAGEMENT | Facility: CLINIC | Age: 53
End: 2018-09-27

## 2018-11-09 ENCOUNTER — TRANSFERRED RECORDS (OUTPATIENT)
Dept: HEALTH INFORMATION MANAGEMENT | Facility: CLINIC | Age: 53
End: 2018-11-09

## 2018-11-16 ENCOUNTER — DOCUMENTATION ONLY (OUTPATIENT)
Dept: GASTROENTEROLOGY | Facility: CLINIC | Age: 53
End: 2018-11-16

## 2018-11-16 NOTE — PROGRESS NOTES
Received incoming fax from [facility] for lab results 11/9/18; has been scanned into chart.    Called out to lab and spoke with Freya advising this [facility] change and will no longer need lab results.

## 2019-02-26 ENCOUNTER — PATIENT OUTREACH (OUTPATIENT)
Dept: GASTROENTEROLOGY | Facility: CLINIC | Age: 54
End: 2019-02-26

## 2019-02-26 NOTE — PROGRESS NOTES
Returned voice mail message about how she obtained her medial records. Left her a message that clinic can not sent out records she can go on the website to obtain records.

## 2019-06-27 ENCOUNTER — APPOINTMENT (OUTPATIENT)
Dept: CT IMAGING | Facility: CLINIC | Age: 54
End: 2019-06-27
Attending: EMERGENCY MEDICINE
Payer: COMMERCIAL

## 2019-06-27 ENCOUNTER — HOSPITAL ENCOUNTER (EMERGENCY)
Facility: CLINIC | Age: 54
Discharge: HOME | End: 2019-06-27
Attending: EMERGENCY MEDICINE | Admitting: EMERGENCY MEDICINE
Payer: COMMERCIAL

## 2019-06-27 VITALS
SYSTOLIC BLOOD PRESSURE: 126 MMHG | RESPIRATION RATE: 16 BRPM | DIASTOLIC BLOOD PRESSURE: 68 MMHG | OXYGEN SATURATION: 94 % | HEART RATE: 65 BPM | TEMPERATURE: 97.5 F | BODY MASS INDEX: 20.89 KG/M2 | HEIGHT: 62 IN

## 2019-06-27 DIAGNOSIS — K50.90 CROHN'S DISEASE WITHOUT COMPLICATION, UNSPECIFIED GASTROINTESTINAL TRACT LOCATION (H): ICD-10-CM

## 2019-06-27 LAB
ANION GAP SERPL CALCULATED.3IONS-SCNC: 5 MMOL/L (ref 3–14)
BASOPHILS # BLD AUTO: 0 10E9/L (ref 0–0.2)
BASOPHILS NFR BLD AUTO: 0.3 %
BUN SERPL-MCNC: 13 MG/DL (ref 7–30)
C DIFF TOX B STL QL: NEGATIVE
CALCIUM SERPL-MCNC: 8.7 MG/DL (ref 8.5–10.1)
CHLORIDE SERPL-SCNC: 107 MMOL/L (ref 94–109)
CO2 SERPL-SCNC: 28 MMOL/L (ref 20–32)
CREAT SERPL-MCNC: 0.6 MG/DL (ref 0.52–1.04)
DIFFERENTIAL METHOD BLD: ABNORMAL
EOSINOPHIL # BLD AUTO: 0 10E9/L (ref 0–0.7)
EOSINOPHIL NFR BLD AUTO: 0.5 %
ERYTHROCYTE [DISTWIDTH] IN BLOOD BY AUTOMATED COUNT: 12.2 % (ref 10–15)
GFR SERPL CREATININE-BSD FRML MDRD: >90 ML/MIN/{1.73_M2}
GLUCOSE SERPL-MCNC: 86 MG/DL (ref 70–99)
HCT VFR BLD AUTO: 39.3 % (ref 35–47)
HGB BLD-MCNC: 13.7 G/DL (ref 11.7–15.7)
IMM GRANULOCYTES # BLD: 0 10E9/L (ref 0–0.4)
IMM GRANULOCYTES NFR BLD: 0.2 %
INTERPRETATION ECG - MUSE: NORMAL
LYMPHOCYTES # BLD AUTO: 2 10E9/L (ref 0.8–5.3)
LYMPHOCYTES NFR BLD AUTO: 30.3 %
MCH RBC QN AUTO: 33.7 PG (ref 26.5–33)
MCHC RBC AUTO-ENTMCNC: 34.9 G/DL (ref 31.5–36.5)
MCV RBC AUTO: 97 FL (ref 78–100)
MONOCYTES # BLD AUTO: 0.3 10E9/L (ref 0–1.3)
MONOCYTES NFR BLD AUTO: 4.4 %
NEUTROPHILS # BLD AUTO: 4.2 10E9/L (ref 1.6–8.3)
NEUTROPHILS NFR BLD AUTO: 64.3 %
NRBC # BLD AUTO: 0 10*3/UL
NRBC BLD AUTO-RTO: 0 /100
PLATELET # BLD AUTO: 167 10E9/L (ref 150–450)
POTASSIUM SERPL-SCNC: 3.6 MMOL/L (ref 3.4–5.3)
RBC # BLD AUTO: 4.07 10E12/L (ref 3.8–5.2)
SODIUM SERPL-SCNC: 140 MMOL/L (ref 133–144)
SPECIMEN SOURCE: NORMAL
TROPONIN I SERPL-MCNC: <0.015 UG/L (ref 0–0.04)
WBC # BLD AUTO: 6.6 10E9/L (ref 4–11)

## 2019-06-27 PROCEDURE — 25000128 H RX IP 250 OP 636: Performed by: EMERGENCY MEDICINE

## 2019-06-27 PROCEDURE — 96361 HYDRATE IV INFUSION ADD-ON: CPT

## 2019-06-27 PROCEDURE — 84484 ASSAY OF TROPONIN QUANT: CPT | Performed by: EMERGENCY MEDICINE

## 2019-06-27 PROCEDURE — 25000125 ZZHC RX 250: Performed by: EMERGENCY MEDICINE

## 2019-06-27 PROCEDURE — 93005 ELECTROCARDIOGRAM TRACING: CPT

## 2019-06-27 PROCEDURE — 85025 COMPLETE CBC W/AUTO DIFF WBC: CPT | Performed by: EMERGENCY MEDICINE

## 2019-06-27 PROCEDURE — 99285 EMERGENCY DEPT VISIT HI MDM: CPT | Mod: 25

## 2019-06-27 PROCEDURE — 96375 TX/PRO/DX INJ NEW DRUG ADDON: CPT

## 2019-06-27 PROCEDURE — 87493 C DIFF AMPLIFIED PROBE: CPT | Performed by: EMERGENCY MEDICINE

## 2019-06-27 PROCEDURE — 74177 CT ABD & PELVIS W/CONTRAST: CPT

## 2019-06-27 PROCEDURE — 96374 THER/PROPH/DIAG INJ IV PUSH: CPT

## 2019-06-27 PROCEDURE — 80048 BASIC METABOLIC PNL TOTAL CA: CPT | Performed by: EMERGENCY MEDICINE

## 2019-06-27 RX ORDER — IOPAMIDOL 755 MG/ML
58 INJECTION, SOLUTION INTRAVASCULAR ONCE
Status: COMPLETED | OUTPATIENT
Start: 2019-06-27 | End: 2019-06-27

## 2019-06-27 RX ORDER — ONDANSETRON 2 MG/ML
4 INJECTION INTRAMUSCULAR; INTRAVENOUS ONCE
Status: COMPLETED | OUTPATIENT
Start: 2019-06-27 | End: 2019-06-27

## 2019-06-27 RX ORDER — MORPHINE SULFATE 4 MG/ML
4 INJECTION, SOLUTION INTRAMUSCULAR; INTRAVENOUS ONCE
Status: COMPLETED | OUTPATIENT
Start: 2019-06-27 | End: 2019-06-27

## 2019-06-27 RX ORDER — MORPHINE SULFATE 30 MG/1
5 TABLET ORAL EVERY 4 HOURS PRN
Qty: 16 TABLET | Refills: 0 | Status: SHIPPED | OUTPATIENT
Start: 2019-06-27

## 2019-06-27 RX ADMIN — SODIUM CHLORIDE 60 ML: 9 INJECTION, SOLUTION INTRAVENOUS at 16:19

## 2019-06-27 RX ADMIN — IOPAMIDOL 58 ML: 755 INJECTION, SOLUTION INTRAVENOUS at 16:19

## 2019-06-27 RX ADMIN — MORPHINE SULFATE 4 MG: 4 INJECTION INTRAVENOUS at 15:22

## 2019-06-27 RX ADMIN — ONDANSETRON 4 MG: 2 INJECTION INTRAMUSCULAR; INTRAVENOUS at 16:54

## 2019-06-27 RX ADMIN — SODIUM CHLORIDE 1000 ML: 9 INJECTION, SOLUTION INTRAVENOUS at 15:12

## 2019-06-27 ASSESSMENT — ENCOUNTER SYMPTOMS
ABDOMINAL PAIN: 1
VOMITING: 1
DIARRHEA: 1
NAUSEA: 1
FEVER: 0
BLOOD IN STOOL: 1

## 2019-06-27 NOTE — ED PROVIDER NOTES
"  History     Chief Complaint:  Nausea, vomiting, diarrhea, and abdominal pain.    JENNIFER Dhillon is a 53 year old female with a history of Chron's disease and C. Diff who presents to the ED for the evaluation of nausea, vomiting, diarrhea, and abdominal pain. The patient reports that she has been having brown, red stools since 6/23. The patient reports that she started vomiting at 0300, having blood mixed in with her vomit, and having abdominal pain rated \"1000/10\" that is usually 3-5/10. She also endorses a heaviness in her chest that began this morning. The patient endorses having a fever for a couple of days, lightheadedness, weight loss over the last week, and shaking that began 1.5 months ago. Three weeks ago, the patient went off of her Vancomycin for C. Diff and then got an ear infection that she still has, where she was given steroid ear drops. The patient denies having a bloated abdomen or urinating much. Of note, the patient is undergoing remicade treatment every 6 weeks for her Chron's disease, with her last treatment 2 weeks ago. The patient reports that her current symptoms are reflective of previous Crohn's flare ups. She denies fever.      Allergies:  Dilaudid  Luminal  Penicillins  Codeine     Medications:    Tylenol  Diazepam  Nexium  Levsin  Zofran  Serevent  Sertraline    Problem List:    Crohn's disease  C. Diff     Past Medical History:    Anxiety  Crohn's disease    Past Surgical History:    Cholecystectomy     Family History:    No family history on file.    Social History:  Smoking Status: Current every day smoker  Smokeless Tobacco: Never Used  Alcohol Use: Negative  PCP: Sami Wright  Marital Status:       Review of Systems   Constitutional: Negative for fever.   Cardiovascular: Positive for chest pain (heaviness).   Gastrointestinal: Positive for abdominal pain, blood in stool, diarrhea, nausea and vomiting.   All other systems reviewed and are negative.    Physical Exam " "    Patient Vitals for the past 24 hrs:   BP Temp Temp src Pulse Resp SpO2 Height   06/27/19 1700 -- -- -- -- -- 94 % --   06/27/19 1600 -- -- -- -- -- 97 % --   06/27/19 1426 126/68 97.5  F (36.4  C) Temporal 65 16 100 % 1.575 m (5' 2\")     Physical Exam  SKIN:  Warm, dry.  No jaundice.    HEMATOLOGIC/IMMUNOLOGIC/LYMPHATIC:  No pallor.  EYES:  Conjunctivae normal.  Anicteric.  CARDIOVASCULAR:  Regular rate and rhythm.  No murmur.  RESPIRATORY:  No respiratory distress, breath sounds equal and normal.  GASTROINTESTINAL:  Soft nontender abdomen with active bowel sounds.  No distension.  No abdominal mass.  MUSCULOSKELETAL: Thin body habitus.  NEUROLOGIC:  Alert, conversant.  PSYCHIATRIC:  Anxious mood.    Emergency Department Course     ECG:  ECG taken at 1512, ECG read at 1516  Normal sinus rhythm with sinus arrhythmia  Normal ECG  Rate 66 bpm. MT interval 182 ms. QRS duration 86 ms. QT/QTc 444/465 ms. P-R-T axes 66 75 60.    Imaging:  Radiology findings were communicated with the patient who voiced understanding of the findings.    CT Abdomen Pelvis w Contrast  No acute abnormality. No bowel obstruction or  Inflammation.  Reading per radiology    Laboratory:  Laboratory findings were communicated with the patient who voiced understanding of the findings.    C. Diff toxin B PCR: pending  CBC: WBC 6.6, HGB 13.7,   BMP: WNL (Creatinine 0.60)  Troponin I: <0.015    Interventions:  1526 Omnipaque 50 mL PO  1512 NS 1000 mL IV  1522 Morphine 4 mg IV  1654 Zofran 4 mg IV    Emergency Department Course:    1450 Nursing notes and vitals reviewed.    1455 I performed an exam of the patient as documented above.     1512 EKG obtained as noted above.    1513 IV was inserted and blood was drawn for laboratory testing, results above.    1627 The patient was sent for a CT while in the emergency department, results above.     1650 Patient rechecked and updated. Discussed CT results    1701 Patient rechecked and updated. "  was present and answered his questions.    1738 Prior to discharge, I personally reviewed the EKG, imaging, and laboratory results with the patient and answered all related questions . Patient discharged.     Impression & Plan      Medical Decision Making:  Melodie Dhillon is a 53 year old female who presents with increased abdominal pain from her chronic baseline in the context of Chron's disease. Also, vomiting, blood in the stool, and emesis output. She showed me pictures on her cell phone of her stool and vomit, and there were blood streaks but not an amount of blood that appeared concerning. She underwent the above evaluation given her symptoms, all of which was relatively reassuring. No findings in the CT scan of the abdomen of perforation, abscess, volvulus, or obstruction. Incidental renal cyst. Cardiac testing also negative. Hemoglobin normal with respect to her reports of bleeding. She did come in with a stool sample so that was sent to the lab for C. Diff testing given her symptoms and history. She improved the treatment and feels she can discharged to continue outpatient management of her symptoms and follow up with her gastroenterologist. She tolerated morphine well and just received 4 mg. I prescribed morphine tablets and she has plenty of Zofran at home to continue. Pending gastroenterology follow up, if she worsens or has any new concerning symptoms, I advised a re-check here.    Diagnosis:    ICD-10-CM    1. Crohn's disease without complication, unspecified gastrointestinal tract location (H) K50.90      Disposition:   The patient is discharged to home.    Discharge Medications:       START taking      Dose / Directions   morphine 5 MG solu-tab      Dose:  5 mg  Take 1 tablet (5 mg) by mouth every 4 hours as needed for shortness of breath / dyspnea or moderate to severe pain  Quantity:  16 tablet  Refills:  0           Where to get your medicines      Some of these will need a paper  prescription and others can be bought over the counter. Ask your nurse if you have questions.    Bring a paper prescription for each of these medications    morphine 5 MG solu-tab       Scribe Disclosure:  I, Roberto Aquino, am serving as a scribe at 3:40 PM on 6/27/2019 to document services personally performed by Darius Okeefe MD based on my observations and the provider's statements to me.   EMERGENCY DEPARTMENT       Darius Okeefe MD  06/27/19 [id number]

## 2019-06-27 NOTE — ED AVS SNAPSHOT
Emergency Department  [address] [place] [address]  Phone:  [phone]  Fax:  [phone]                                    Melodie Dhillon   MRN: [id number]    Department:   Emergency Department   Date of Visit:  6/27/2019           After Visit Summary Signature Page    I have received my discharge instructions, and my questions have been answered. I have discussed any challenges I see with this plan with the nurse or doctor.    ..........................................................................................................................................  Patient/Patient Representative Signature      ..........................................................................................................................................  Patient Representative Print Name and Relationship to Patient    ..................................................               ................................................  Date                                   Time    ..........................................................................................................................................  Reviewed by Signature/Title    ...................................................              ..............................................  Date                                               Time          22EPIC Rev 08/18

## 2019-06-28 NOTE — RESULT ENCOUNTER NOTE
Final Clostridium Difficile toxin B PCR is NEGATIVE.    No treatment or change in treatment per [facility] ED Lab Result protocol.

## 2020-03-10 ENCOUNTER — HEALTH MAINTENANCE LETTER (OUTPATIENT)
Age: 55
End: 2020-03-10

## 2020-12-20 ENCOUNTER — HEALTH MAINTENANCE LETTER (OUTPATIENT)
Age: 55
End: 2020-12-20

## 2021-04-24 ENCOUNTER — HEALTH MAINTENANCE LETTER (OUTPATIENT)
Age: 56
End: 2021-04-24

## 2021-10-03 ENCOUNTER — HEALTH MAINTENANCE LETTER (OUTPATIENT)
Age: 56
End: 2021-10-03

## 2022-05-15 ENCOUNTER — HEALTH MAINTENANCE LETTER (OUTPATIENT)
Age: 57
End: 2022-05-15

## 2022-09-11 ENCOUNTER — HEALTH MAINTENANCE LETTER (OUTPATIENT)
Age: 57
End: 2022-09-11

## 2023-04-30 ENCOUNTER — HEALTH MAINTENANCE LETTER (OUTPATIENT)
Age: 58
End: 2023-04-30

## 2023-06-03 ENCOUNTER — HEALTH MAINTENANCE LETTER (OUTPATIENT)
Age: 58
End: 2023-06-03

## 2024-07-07 ENCOUNTER — HEALTH MAINTENANCE LETTER (OUTPATIENT)
Age: 59
End: 2024-07-07

## 2025-07-19 ENCOUNTER — HEALTH MAINTENANCE LETTER (OUTPATIENT)
Age: 60
End: 2025-07-19